# Patient Record
Sex: FEMALE | Race: WHITE | NOT HISPANIC OR LATINO | Employment: OTHER | URBAN - METROPOLITAN AREA
[De-identification: names, ages, dates, MRNs, and addresses within clinical notes are randomized per-mention and may not be internally consistent; named-entity substitution may affect disease eponyms.]

---

## 2017-03-06 ENCOUNTER — TELEPHONE (OUTPATIENT)
Dept: OBSTETRICS AND GYNECOLOGY | Facility: CLINIC | Age: 72
End: 2017-03-06

## 2017-03-06 DIAGNOSIS — N30.90 CYSTITIS: Primary | ICD-10-CM

## 2017-03-06 NOTE — TELEPHONE ENCOUNTER
Pt reports lower abdominal & back pain and a fever yesterday. Placed orders for a urinalysis and instructed pt to go to lab in her area and to provide a urine sample. Pt communicated understanding.    Sent message to Dr. Glass informing her of steps taken.

## 2017-03-06 NOTE — TELEPHONE ENCOUNTER
Discussed symptoms with patient, she denies urgency and frequency. Had lower abdominal cramps, and low grade temp. Had a colonoscopy showing Diverticulosis. Advised to contact PCP, go on soft bland diet, and may need to go to ED if worsens. Discussed Diverticulitis/Diverticulosis.

## 2017-03-06 NOTE — TELEPHONE ENCOUNTER
----- Message from Naheed Miller sent at 3/6/2017  8:09 AM CST -----  Contact: Patient  X _1st Request  _  2nd Request  _  3rd Request    Who:MAIA BELL [[id number]]    Why:Patient states she has a bladder infection and needs to know what can be done for her     What Number to Call Back:Patient can be reached at [phone]    When to Expect a call back: (Before the end of the day)   -- if call after 3:00 call back will be tomorrow.

## 2017-03-06 NOTE — TELEPHONE ENCOUNTER
----- Message from Stephanie Gray LPN sent at 3/6/2017 10:40 AM CST -----  Contact: patient  FYI. Pt called reporting lower back pain, abdominal pain, and a fever yesterday. Orders placed for a urinalysis. Pt will go to the lab near her today and provide a urine sample.

## 2017-08-21 ENCOUNTER — OFFICE VISIT (OUTPATIENT)
Dept: OBSTETRICS AND GYNECOLOGY | Facility: CLINIC | Age: 72
End: 2017-08-21
Attending: OBSTETRICS & GYNECOLOGY
Payer: MEDICARE

## 2017-08-21 VITALS
BODY MASS INDEX: 26.51 KG/M2 | HEIGHT: 61 IN | SYSTOLIC BLOOD PRESSURE: 160 MMHG | DIASTOLIC BLOOD PRESSURE: 84 MMHG | WEIGHT: 140.44 LBS

## 2017-08-21 DIAGNOSIS — Z01.419 ENCOUNTER FOR ROUTINE GYNECOLOGIC EXAMINATION IN MEDICARE PATIENT: Primary | ICD-10-CM

## 2017-08-21 DIAGNOSIS — E89.40 POSTSURGICAL MENOPAUSE: ICD-10-CM

## 2017-08-21 DIAGNOSIS — N95.2 POSTMENOPAUSAL ATROPHIC VAGINITIS: ICD-10-CM

## 2017-08-21 DIAGNOSIS — Z12.31 SCREENING MAMMOGRAM FOR HIGH-RISK PATIENT: ICD-10-CM

## 2017-08-21 PROCEDURE — 99999 PR PBB SHADOW E&M-EST. PATIENT-LVL III: CPT | Mod: PBBFAC,,, | Performed by: OBSTETRICS & GYNECOLOGY

## 2017-08-21 PROCEDURE — G0101 CA SCREEN;PELVIC/BREAST EXAM: HCPCS | Mod: S$GLB,,, | Performed by: OBSTETRICS & GYNECOLOGY

## 2017-08-21 RX ORDER — ESTRADIOL 0.1 MG/G
0.5 CREAM VAGINAL
Qty: 42 G | Refills: 4 | Status: SHIPPED | OUTPATIENT
Start: 2017-08-21 | End: 2018-08-21 | Stop reason: SDUPTHER

## 2017-08-21 NOTE — PROGRESS NOTES
"  Subjective:       Patient ID: Lizeth Blake is a 71 y.o. female.    Chief Complaint:  Gynecologic Exam      History of Present Illness  HPI  Lizeth Blake is a 71 y.o. female  here for her annual GYN exam.     Denies vaginal itching or irritation.  Denies vaginal discharge.  She is sexually active. She has had1 partner for 41 years .   History of abnormal pap: No  Last Pap: was normal  Last MMG: normal--routine follow-up in 12 months  Last Colonoscopy:  colonoscopy 2 years ago without abnormalities.  Denies domestic violence. She does feel safe at home.     Past Medical History:   Diagnosis Date    a Atypical Chest Pain 2015 Referred To Dr. Miguel Vazquez; 14 Ca+ Score Was Low    d Family H/O DM     f Osteopenia     j Chronic Constipation     Dr. Yovany Sauer    j H/O Acute Sigmoid Diverticulitis 2017     Dr. Yovany Sauer; 3/10/17 ABD/Pelvic CT W/WO Contrast = (See Report)    j H/O Hyperplastic Colon Polyps On 13 TC     Dr. Yovany Sauer; Dr. Marysol King (After Her 6/8/15 TC Was Polyp Free): "Repeat TC In 10 YRs"    j Sigmoid And Descending Diverticulosis     Dr. Yovany Sauer; 6/8/15 TC (Dr. Marysol King) = Small IH And EHs, Multiple Small-Mouthed Sigmoid And Descending Colon Diverticulosis; With NO Polyps, And Otherwise Normal    j Small Internal And External Hemorrhoids     Dr. Yovany Sauer; 6/8/15 TC (Dr. Marysol King) = Small IH And EHs, Multiple Small-Mouthed Sigmoid And Descending Colon Diverticulosis; With NO Polyps, And Otherwise Normal    k Family H/O Breast Cancer     k H/O Breast Lumps With Negative Bx     l BLE Toe And Foot And Calf Cramps     16 Zanaflex 1-2 Mg qHS Prn    l Cervical Spinal DDD     Dr. Christopher phillip Lumbar Spinal DDD     Dr. Christopher phillip Lumbar Spinal Stenosis     Dr. Christopher phillip Right Palm Dupuytren's Nodule     Dr. Attila silva Rare Migraines     Wellness Visit 16      Past Surgical " "History:   Procedure Laterality Date    arm surgery( L shoulder) Left     open procedure on shoulder with tendon reconstruction & pin placement    Breast biopsy x 2 Right Breast      BREAST SURGERY      DILATION AND CURETTAGE OF UTERUS      HYSTERECTOMY      TAHBSO    MOUTH SURGERY      PELVIC LAPAROSCOPY  age 36    SHOULDER ARTHROSCOPY Left -    Tonsillectomy       Social History     Social History    Marital status:      Spouse name: N/A    Number of children: N/A    Years of education: N/A     Occupational History    Not on file.     Social History Main Topics    Smoking status: Never Smoker    Smokeless tobacco: Never Used    Alcohol use 0.0 oz/week      Comment: Rarely    Drug use: No    Sexual activity: Yes     Partners: Male     Birth control/ protection: See Surgical Hx      Comment:  since  (41 years)     Other Topics Concern    Not on file     Social History Narrative    No narrative on file     Family History   Problem Relation Age of Onset    Breast cancer Mother 73    Diabetes Mother     Stroke Sister     Colon cancer Cousin     Cervical cancer Paternal Aunt     Coronary artery disease Father     Diabetes Maternal Aunt     Diabetes Maternal Uncle     Diabetes Maternal Grandmother     Ovarian cancer Neg Hx     Hypertension Neg Hx      OB History      Para Term  AB Living    2 1 1   1 1    SAB TAB Ectopic Multiple Live Births            1          BP (!) 160/84   Ht 5' 1" (1.549 m)   Wt 63.7 kg (140 lb 6.9 oz)   BMI 26.53 kg/m²         GYN & OB History    Date of Last Pap: No result found    OB History    Para Term  AB Living   2 1 1   1 1   SAB TAB Ectopic Multiple Live Births           1      # Outcome Date GA Lbr Dash/2nd Weight Sex Delivery Anes PTL Lv   2 AB            1 Term      Vag-Spont   JAS          Review of Systems  Review of Systems   Constitutional: Negative for activity change, appetite change, " fatigue and unexpected weight change.   HENT: Negative.    Eyes: Negative for visual disturbance.   Respiratory: Negative for shortness of breath and wheezing.    Cardiovascular: Negative for chest pain, palpitations and leg swelling.   Gastrointestinal: Negative for abdominal pain, bloating and blood in stool.   Endocrine: Negative for diabetes and hair loss.   Genitourinary: Positive for dyspareunia. Negative for decreased libido.   Musculoskeletal: Negative for back pain and joint swelling.   Skin:  Negative for no acne and hair changes.   Neurological: Negative for headaches.   Hematological: Does not bruise/bleed easily.   Psychiatric/Behavioral: Negative for depression and sleep disturbance. The patient is not nervous/anxious.    Breast: Negative for breast pain and nipple discharge          Objective:    Physical Exam:   Constitutional: She is oriented to person, place, and time. She appears well-developed and well-nourished.    HENT:   Head: Normocephalic and atraumatic.    Eyes: EOM are normal. Pupils are equal, round, and reactive to light.    Neck: Normal range of motion. Neck supple.    Cardiovascular: Normal rate and regular rhythm.     Pulmonary/Chest: Effort normal and breath sounds normal.   BREASTS: Symmetrical, no skin changes or visible lesions.  No palpable masses, nipple discharge bilaterally.          Abdominal: Soft. Bowel sounds are normal.     Genitourinary: Pelvic exam was performed with patient supine.   Genitourinary Comments: PELVIC: Normal external female genitalia without lesions. Normal hair distribution. Adequate perineal body, normal urethral meatus. Vagina atrophic without lesions or discharge. No significant cystocele or rectocele. Bimanual exam shows uterus and cervix to be surgically absent. Adnexa without masses or tenderness.  RECTAL: Deferred             Musculoskeletal: Normal range of motion and moves all extremeties.       Neurological: She is alert and oriented to person,  place, and time.    Skin: Skin is warm and dry.    Psychiatric: She has a normal mood and affect.          Assessment:        1. Encounter for routine gynecologic examination in Medicare patient    2. Postmenopausal atrophic vaginitis    3. Screening mammogram for high-risk patient    4. Postsurgical menopause               Plan:      1. Encounter for routine gynecologic examination in Medicare patient  COUNSELING:  The patient was counseled today on osteoporosis prevention, calcium supplementation, and regular weight bearing exercise. The patient was also counseled today on ACS PAP guidelines, with recommendations for yearly pelvic exams unless their uterus, cervix, and ovaries were removed for benign reasons; in that case, examinations every 3-5 years are recommended. The patient was also counseled regarding monthly breast self-examination, routine STD screening for at-risk populations, prophylactic immunizations for transmitted infections such as HPV, Pertussis, or Influenza as appropriate, and yearly mammograms when indicated by ACS guidelines. She was advised to see her primary care physician for all other health maintenance.   FOLLOW-UP with me for next routine visit.         2. Postmenopausal atrophic vaginitis    - estradiol (ESTRACE) 0.01 % (0.1 mg/gram) vaginal cream; Place 0.5 g vaginally twice a week. Insert 0.5grams intravaginally twice weekly  Dispense: 42 g; Refill: 4    3. Screening mammogram for high-risk patient    - Mammo Digital Screening Bilat with Tomosynthesis CAD; Future    4. Postsurgical menopause    - DXA Bone Density Spine And Hip; Future       Return in about 2 years (around 8/21/2019).

## 2017-08-22 ENCOUNTER — PATIENT MESSAGE (OUTPATIENT)
Dept: OBSTETRICS AND GYNECOLOGY | Facility: CLINIC | Age: 72
End: 2017-08-22

## 2018-08-07 ENCOUNTER — TELEPHONE (OUTPATIENT)
Dept: PAIN MEDICINE | Facility: CLINIC | Age: 73
End: 2018-08-07

## 2018-08-07 NOTE — TELEPHONE ENCOUNTER
----- Message from Elke Devlin LPN sent at 8/6/2018 12:56 PM CDT -----  Pt requesting an appointment for neck pain. Please call pt with soonest available appointment. Thanks!

## 2018-08-21 ENCOUNTER — OFFICE VISIT (OUTPATIENT)
Dept: OBSTETRICS AND GYNECOLOGY | Facility: CLINIC | Age: 73
End: 2018-08-21
Attending: OBSTETRICS & GYNECOLOGY
Payer: MEDICARE

## 2018-08-21 VITALS
DIASTOLIC BLOOD PRESSURE: 80 MMHG | BODY MASS INDEX: 24.38 KG/M2 | HEIGHT: 63 IN | WEIGHT: 137.56 LBS | SYSTOLIC BLOOD PRESSURE: 120 MMHG

## 2018-08-21 DIAGNOSIS — N95.2 POSTMENOPAUSAL ATROPHIC VAGINITIS: ICD-10-CM

## 2018-08-21 DIAGNOSIS — N64.4 MASTODYNIA OF RIGHT BREAST: Primary | ICD-10-CM

## 2018-08-21 DIAGNOSIS — Z01.411 ENCOUNTER FOR GYNECOLOGICAL EXAMINATION WITH ABNORMAL FINDING: ICD-10-CM

## 2018-08-21 PROCEDURE — 99213 OFFICE O/P EST LOW 20 MIN: CPT | Mod: S$GLB,,, | Performed by: OBSTETRICS & GYNECOLOGY

## 2018-08-21 PROCEDURE — 99999 PR PBB SHADOW E&M-EST. PATIENT-LVL III: CPT | Mod: PBBFAC,,, | Performed by: OBSTETRICS & GYNECOLOGY

## 2018-08-21 RX ORDER — ESTRADIOL 0.1 MG/G
0.5 CREAM VAGINAL
Qty: 42 G | Refills: 4 | Status: SHIPPED | OUTPATIENT
Start: 2018-08-23 | End: 2019-09-09 | Stop reason: SDUPTHER

## 2018-08-21 NOTE — PROGRESS NOTES
"  Subjective:       Patient ID: Lizeth Blake is a 72 y.o. female.    Chief Complaint:  atrophic vaginitis (menopause) and mastodynia      History of Present Illness  HPI  Lizeth Blake is a 72 y.o. female  here for her  GYN exam with complaints of RIght sided breast pain for the past 2 months. She also has vaginal dryness which results in dyspareunia when she forgets to use her Estrace cream.     denies vaginal itching or irritation.  Denies vaginal discharge.  She is sexually active. She has had 1 partner for 44 years .   History of abnormal pap: No  Last Pap: was normal  Last MMG: normal--routine follow-up in 12 months  Last Colonoscopy:  colonoscopy a few years ago without abnormalities.  denies domestic violence. She does feel safe at home.     Past Medical History:   Diagnosis Date    a Atypical Chest Pain 2015     Dr. Miguel Vazquez Evaluated Her And Was Negative For CAD; 14 Ca+ Score Was Low    Abnormal Pap smear of cervix     Costochondritis     d Family H/O DM     f Osteopenia     j Chronic Constipation     Dr. Yovany Sauer    j H/O Acute Sigmoid Diverticulitis 2017     Dr. Yovany Sauer; 3/10/17 ABD/Pelvic CT W/WO Contrast = (See Report)    j H/O Hyperplastic Colon Polyps On 13 TC     Dr. Yovany Sauer; Dr. Marysol King (After Her 6/8/15 TC Was Polyp Free): "Repeat TC In 10 YRs"    j Sigmoid And Descending Diverticulosis     Dr. Yovany Sauer; 6/8/15 TC (Dr. Marysol King) = Small IH And EHs, Multiple Small-Mouthed Sigmoid And Descending Colon Diverticulosis; With NO Polyps, And Otherwise Normal    j Small Internal And External Hemorrhoids     Dr. Yovany Sauer; 6/8/15 TC (Dr. Marysol King) = Small IH And EHs, Multiple Small-Mouthed Sigmoid And Descending Colon Diverticulosis; With NO Polyps, And Otherwise Normal    k Family H/O Breast Cancer     k H/O Breast Lumps With Negative Bx     l BLE Toe And Foot And Calf Cramps     16 Zanaflex 1-2 Mg qHS Prn    l " Cervical Spinal DDD     Dr. hCristopher phillip Lumbar Spinal DDD     Dr. Christopher phillip Lumbar Spinal Stenosis     Dr. Christopher phillip Right Palm Dupuytren's Nodule     Dr. Attila silva Rare Migraines     q Borderline Vitamin D Deficiency     1/7/18 RXd OTC D3 2K IU Daily    Wellness Visit 12/18/2017      Past Surgical History:   Procedure Laterality Date    arm surgery( L shoulder) Left 1980    open procedure on shoulder with tendon reconstruction & pin placement    BREAST BIOPSY Right     Breast biopsy x 2 Right Breast      DILATION AND CURETTAGE OF UTERUS      HYSTERECTOMY  2000    TAHBSO    MOUTH SURGERY      PELVIC LAPAROSCOPY  age 36    SHOULDER ARTHROSCOPY Left 2-2014    Tonsillectomy       Social History     Socioeconomic History    Marital status:      Spouse name: Not on file    Number of children: Not on file    Years of education: Not on file    Highest education level: Not on file   Social Needs    Financial resource strain: Not on file    Food insecurity - worry: Not on file    Food insecurity - inability: Not on file    Transportation needs - medical: Not on file    Transportation needs - non-medical: Not on file   Occupational History    Not on file   Tobacco Use    Smoking status: Never Smoker    Smokeless tobacco: Never Used   Substance and Sexual Activity    Alcohol use: Yes     Alcohol/week: 0.0 oz     Comment: Rarely    Drug use: No    Sexual activity: Yes     Partners: Male     Birth control/protection: See Surgical Hx     Comment:  since 1976 (42 years)   Other Topics Concern    Not on file   Social History Narrative    Not on file     Family History   Problem Relation Age of Onset    Breast cancer Mother 73    Diabetes Mother     Stroke Sister     Colon cancer Cousin     Cervical cancer Paternal Aunt     Coronary artery disease Father     Diabetes Maternal Aunt     Diabetes Maternal Uncle     Diabetes Maternal Grandmother      "Ovarian cancer Neg Hx     Hypertension Neg Hx      OB History      Para Term  AB Living    2 1 1   1 1    SAB TAB Ectopic Multiple Live Births            1          /80   Ht 5' 3" (1.6 m)   Wt 62.4 kg (137 lb 9.1 oz)   BMI 24.37 kg/m²         GYN & OB History    Date of Last Pap: No result found    OB History    Para Term  AB Living   2 1 1   1 1   SAB TAB Ectopic Multiple Live Births           1      # Outcome Date GA Lbr Dash/2nd Weight Sex Delivery Anes PTL Lv   2 AB            1 Term      Vag-Spont   JAS          Review of Systems  Review of Systems   Constitutional: Negative for activity change, appetite change, fatigue and unexpected weight change.   HENT: Negative.    Eyes: Negative for visual disturbance.   Respiratory: Negative for shortness of breath and wheezing.    Cardiovascular: Negative for chest pain, palpitations and leg swelling.   Gastrointestinal: Negative for abdominal pain, bloating and blood in stool.   Endocrine: Negative for diabetes, hair loss and hot flashes.   Genitourinary: Positive for dyspareunia. Negative for decreased libido and postmenopausal bleeding.   Musculoskeletal: Negative for back pain and joint swelling.   Skin:  Negative for no acne and hair changes.   Neurological: Negative for headaches.   Hematological: Does not bruise/bleed easily.   Psychiatric/Behavioral: Negative for depression and sleep disturbance. The patient is not nervous/anxious.    Breast: Negative for breast pain and nipple discharge          Objective:      Physical Exam:   Constitutional: She is oriented to person, place, and time. She appears well-developed and well-nourished.    HENT:   Head: Normocephalic and atraumatic.    Eyes: EOM are normal. Pupils are equal, round, and reactive to light.    Neck: Normal range of motion. Neck supple.    Cardiovascular: Normal rate and regular rhythm.     Pulmonary/Chest: Effort normal and breath sounds normal.   BREASTS:  no " mass, + tenderness, no deformity and no retraction. Right breast exhibits no inverted nipple, no mass, no nipple discharge, no skin change, + tenderness UOQ, no bleeding and no swelling. Left breast exhibits no inverted nipple, no mass, no nipple discharge, no skin change, no tenderness, no bleeding and no swelling. Breasts are symmetrical.              Abdominal: Soft. Bowel sounds are normal.     Genitourinary: Pelvic exam was performed with patient supine.   Genitourinary Comments: PELVIC: Normal external female genitalia without lesions. Normal hair distribution. Adequate perineal body, normal urethral meatus. Vagina atrophic without lesions or discharge. No significant cystocele or rectocele. Bimanual exam shows uterus and cervix to be surgically absent. Adnexa without masses or tenderness.  RECTAL: Deferred             Musculoskeletal: Normal range of motion and moves all extremeties.       Neurological: She is alert and oriented to person, place, and time.    Skin: Skin is warm and dry.    Psychiatric: She has a normal mood and affect.              Assessment:        1. Mastodynia of right breast    2. Encounter for gynecological examination with abnormal finding    3. Postmenopausal atrophic vaginitis                Plan:      1. Mastodynia of right breast    - US Breast Right Complete; Future  - Mammo Digital Diagnostic Right with Sagar; Future    2. Postmenopausal atrophic vaginitis    - estradiol (ESTRACE) 0.01 % (0.1 mg/gram) vaginal cream; Place 0.5 g vaginally twice a week. Insert 0.5grams intravaginally twice weekly  Dispense: 42 g; Refill: 4       Follow-up if symptoms worsen or fail to improve.

## 2018-08-22 ENCOUNTER — HOSPITAL ENCOUNTER (OUTPATIENT)
Dept: RADIOLOGY | Facility: HOSPITAL | Age: 73
Discharge: HOME | End: 2018-08-22
Attending: OBSTETRICS & GYNECOLOGY
Payer: MEDICARE

## 2018-08-22 DIAGNOSIS — N64.4 MASTODYNIA OF RIGHT BREAST: ICD-10-CM

## 2018-08-22 PROCEDURE — 77065 DX MAMMO INCL CAD UNI: CPT | Mod: TC,PO

## 2018-08-22 PROCEDURE — 77061 BREAST TOMOSYNTHESIS UNI: CPT | Mod: TC,PO

## 2018-08-22 PROCEDURE — 77061 BREAST TOMOSYNTHESIS UNI: CPT | Mod: 26,,, | Performed by: RADIOLOGY

## 2018-08-22 PROCEDURE — 77065 DX MAMMO INCL CAD UNI: CPT | Mod: 26,,, | Performed by: RADIOLOGY

## 2018-10-17 ENCOUNTER — LAB VISIT (OUTPATIENT)
Dept: LAB | Facility: HOSPITAL | Age: 73
End: 2018-10-17
Attending: OBSTETRICS & GYNECOLOGY
Payer: MEDICARE

## 2018-10-17 ENCOUNTER — PATIENT MESSAGE (OUTPATIENT)
Dept: OBSTETRICS AND GYNECOLOGY | Facility: CLINIC | Age: 73
End: 2018-10-17

## 2018-10-17 DIAGNOSIS — R39.9 UTI SYMPTOMS: ICD-10-CM

## 2018-10-17 DIAGNOSIS — R39.9 UTI SYMPTOMS: Primary | ICD-10-CM

## 2018-10-17 DIAGNOSIS — Z12.31 SCREENING MAMMOGRAM, ENCOUNTER FOR: Primary | ICD-10-CM

## 2018-10-17 PROCEDURE — 87086 URINE CULTURE/COLONY COUNT: CPT

## 2018-10-17 RX ORDER — NITROFURANTOIN 25; 75 MG/1; MG/1
100 CAPSULE ORAL 2 TIMES DAILY
Qty: 14 CAPSULE | Refills: 0 | Status: SHIPPED | OUTPATIENT
Start: 2018-10-17 | End: 2018-10-24

## 2018-10-17 RX ORDER — PHENAZOPYRIDINE HYDROCHLORIDE 200 MG/1
200 TABLET, FILM COATED ORAL 3 TIMES DAILY PRN
Qty: 20 TABLET | Refills: 0 | Status: SHIPPED | OUTPATIENT
Start: 2018-10-17 | End: 2019-01-21

## 2018-10-18 ENCOUNTER — PATIENT MESSAGE (OUTPATIENT)
Dept: OBSTETRICS AND GYNECOLOGY | Facility: CLINIC | Age: 73
End: 2018-10-18

## 2018-10-18 LAB — BACTERIA UR CULT: NO GROWTH

## 2018-11-26 ENCOUNTER — HOSPITAL ENCOUNTER (OUTPATIENT)
Dept: RADIOLOGY | Facility: HOSPITAL | Age: 73
Discharge: HOME | End: 2018-11-26
Attending: OBSTETRICS & GYNECOLOGY
Payer: MEDICARE

## 2018-11-26 DIAGNOSIS — Z12.31 SCREENING MAMMOGRAM, ENCOUNTER FOR: ICD-10-CM

## 2018-11-26 PROCEDURE — 77067 SCR MAMMO BI INCL CAD: CPT | Mod: 26,,, | Performed by: RADIOLOGY

## 2018-11-26 PROCEDURE — 77063 BREAST TOMOSYNTHESIS BI: CPT | Mod: TC,PO

## 2018-11-26 PROCEDURE — 77063 BREAST TOMOSYNTHESIS BI: CPT | Mod: 26,,, | Performed by: RADIOLOGY

## 2019-06-12 ENCOUNTER — OFFICE VISIT (OUTPATIENT)
Dept: NEUROSURGERY | Facility: CLINIC | Age: 74
End: 2019-06-12
Payer: MEDICARE

## 2019-06-12 VITALS
HEART RATE: 66 BPM | BODY MASS INDEX: 25.84 KG/M2 | DIASTOLIC BLOOD PRESSURE: 66 MMHG | TEMPERATURE: 98 F | SYSTOLIC BLOOD PRESSURE: 137 MMHG | HEIGHT: 61 IN | WEIGHT: 136.88 LBS

## 2019-06-12 DIAGNOSIS — M50.20 HERNIATED CERVICAL INTERVERTEBRAL DISC: Primary | ICD-10-CM

## 2019-06-12 DIAGNOSIS — M51.26 HERNIATED LUMBAR INTERVERTEBRAL DISC: ICD-10-CM

## 2019-06-12 DIAGNOSIS — M47.22 CERVICAL SPONDYLOSIS WITH RADICULOPATHY: ICD-10-CM

## 2019-06-12 PROCEDURE — 1101F PT FALLS ASSESS-DOCD LE1/YR: CPT | Mod: CPTII,S$GLB,, | Performed by: NEUROLOGICAL SURGERY

## 2019-06-12 PROCEDURE — 99204 OFFICE O/P NEW MOD 45 MIN: CPT | Mod: S$GLB,,, | Performed by: NEUROLOGICAL SURGERY

## 2019-06-12 PROCEDURE — 99999 PR PBB SHADOW E&M-EST. PATIENT-LVL III: ICD-10-PCS | Mod: PBBFAC,,, | Performed by: NEUROLOGICAL SURGERY

## 2019-06-12 PROCEDURE — 99204 PR OFFICE/OUTPT VISIT, NEW, LEVL IV, 45-59 MIN: ICD-10-PCS | Mod: S$GLB,,, | Performed by: NEUROLOGICAL SURGERY

## 2019-06-12 PROCEDURE — 1101F PR PT FALLS ASSESS DOC 0-1 FALLS W/OUT INJ PAST YR: ICD-10-PCS | Mod: CPTII,S$GLB,, | Performed by: NEUROLOGICAL SURGERY

## 2019-06-12 PROCEDURE — 99999 PR PBB SHADOW E&M-EST. PATIENT-LVL III: CPT | Mod: PBBFAC,,, | Performed by: NEUROLOGICAL SURGERY

## 2019-06-12 NOTE — PROGRESS NOTES
Subjective:   I, Pawel Mcmillan, attest that this documentation has been prepared under the direction and in the presence of Rui Salazar MD.     Patient ID: Lizeth Blake is a 73 y.o. female     Chief Complaint: Consult      HPI  Ms. Lizeth Blake is a pleasant 73 y.o. woman who presents today for consultation. Pt states she has a chronic hx of low back and neck pain. In regards to her neck pain, she states it has been presents for at least 15 years. The pain is exacerbated by movement of her right arm and she has difficulty using a computer mouse at this induces pain. She states the pain is constant at an intensity of 9/10. Her back pain, which is also chronic, waxes and wanes. It is localized across her bilateral lower back and also involves sporadic buttock pain bilaterally. She specifies that the left buttock pain is sharp in nature. Additionally, she states she had right groin pain which spontaneously resolved a few months ago. She tried ESIs and physical therapy in the distant past; has not received any recent treatment. Per her recollection, her back pain was exacerbated and her neck pain unchanged after the ESIs.      Review of Systems   Constitutional: Negative for activity change, fatigue, fever and unexpected weight change.   HENT: Negative for facial swelling.    Eyes: Negative.    Respiratory: Negative.    Cardiovascular: Negative.    Gastrointestinal: Negative for diarrhea, nausea and vomiting.   Genitourinary: Negative.    Musculoskeletal: Positive for back pain and neck pain. Negative for joint swelling and myalgias.   Neurological: Negative for dizziness, weakness, numbness and headaches.   Psychiatric/Behavioral: Negative.       Past Medical History:   Diagnosis Date    a Atypical Chest Pain 08/2015     Dr. Miguel Vazquez Evaluated Her And Was Negative For CAD; 5/22/14 Ca+ Score Was Low    d Family H/O DM     f Osteopenia     j Chronic Constipation     Dr. Yovany lo H/O Acute Sigmoid  "Diverticulitis 05/2017     Dr. Yovany Sauer; 3/10/17 ABD/Pelvic CT W/WO Contrast = (See Report)    j H/O Hyperplastic Colon Polyps On 12/20/13 TC     Dr. Yovany Sauer; Dr. Marysol King (After Her 6/8/15 TC Was Polyp Free): "Repeat TC In 10 YRs"    j Sigmoid And Descending Diverticulosis     Dr. Yovany Sauer; 6/8/15 TC (Dr. Marysol King) = Small IH And EHs, Multiple Small-Mouthed Sigmoid And Descending Colon Diverticulosis; With NO Polyps, And Otherwise Normal    j Small Internal And External Hemorrhoids     Dr. Yovany Sauer; 6/8/15 TC (Dr. Marysol King) = Small IH And EHs, Multiple Small-Mouthed Sigmoid And Descending Colon Diverticulosis; With NO Polyps, And Otherwise Normal    k Family H/O Breast Cancer     k H/O Breast Lumps With Negative Bx     l BLE Toe And Foot And Calf Cramps     7/8/16 Zanaflex 1-2 Mg qHS Prn    l Cervical Spinal DDD     Dr. Christopher Kenny; 5/1/19 C-Spine MRI W/O Contrast = (See Report)    l Lumbar Spinal DDD     Dr. Christopher Kenny; 5/1/19 L-Spine MRI W/O Contrast = (See Report)    l Lumbar Spinal Stenosis     Dr. Christopher Kenny; 5/1/19 L-Spine MRI W/O Contrast = (See Report)    l Right Palm Dupuytren's Nodule     Dr. Attila silva Rare Migraines     q Borderline Vitamin D Deficiency     2/10/19 RXd OTC D3 5K IU Daily; 1/7/18 RXd OTC D3 2K IU Daily    Wellness Visit 1/21/19        Objective:      Vitals:    06/12/19 0945   BP: 137/66   Pulse: 66   Temp: 97.8 °F (36.6 °C)      Physical Exam   Constitutional: She is oriented to person, place, and time. She appears well-developed and well-nourished.   HENT:   Head: Normocephalic and atraumatic.   Neck: Neck supple.   Neurological: She is alert and oriented to person, place, and time. No cranial nerve deficit. She displays a negative Romberg sign. GCS eye subscore is 4. GCS verbal subscore is 5. GCS motor subscore is 6.   Reflex Scores:       Bicep reflexes are 2+ on the right side and 2+ on the left side.     "   IMAGING:  MRI Cervical Spine Without Contrast (05/01/2019) shows degenerative changes at C3-4, C4-5, C5-6 with associated disc herniations with foraminal stenosis but without canal stenosis; findings are worse at the C5-6 level.     MRI Lumbar Spine Without Contrast (05/01/2019) shows a disc herniation at L3-4, L4-5, and L5-S1 with mild-to-moderate stenosis at L3-4 and L4-5. There is a Grade I spondylolisthesis at L4-5.      I have personally reviewed the images with the pt.      I, Dr. Rui Salazar, personally performed the services described in this documentation. All medical record entries made by the scribe, Pawel Mcmillan, were at my direction and in my presence.  I have reviewed the chart and agree that the record reflects my personal performance and is accurate and complete. Rui Salazar MD. 06/12/2019    Assessment:       1. Herniated cervical disc.  2. Cervical radiculopathy.  3. Herniated lumbar disc.    Plan:   Neck pain:  I have personally reviewed the MRI cervical spine with the pt which shows degenerative changes at C3-4, C4-5, C5-6 with associated disc herniations with foraminal stenosis but without canal stenosis; findings are worse at the C5-6 level. Given the images, an Anterior cervical discectomy and fusion (ACDF) is indicated. However, we will try conservative treatment with an MEME at this time. I will refer her to pain medicine.      Back pain:  The MRI of lumbar spine shows a disc herniation at L3-4, L4-5, and L5-S1 with mild-to-moderate stenosis at L3-4 and L4-5. There is a Grade I spondylolisthesis at L4-5 .    I will schedule the patient for follow up in 6 weeks to see how she is doing.

## 2019-06-12 NOTE — PATIENT INSTRUCTIONS
Neck pain:  I have personally reviewed the MRI cervical spine with the pt which shows degenerative changes at C3-4, C4-5, C5-6 with associated disc herniations with foraminal stenosis but without canal stenosis; findings are worse at the C5-6 level. Given the images, an Anterior cervical discectomy and fusion (ACDF) is indicated. However, we will try conservative treatment with an MEME at this time. I will refer her to pain medicine.      Back pain:  The MRI of lumbar spine shows a disc herniation at L3-4, L4-5, and L5-S1 with mild-to-moderate stenosis at L3-4 and L4-5. There is a Grade I spondylolisthesis at L4-5.    I will schedule the patient for follow up in 6 weeks to see how she is doing.

## 2019-09-09 ENCOUNTER — OFFICE VISIT (OUTPATIENT)
Dept: OBSTETRICS AND GYNECOLOGY | Facility: CLINIC | Age: 74
End: 2019-09-09
Attending: OBSTETRICS & GYNECOLOGY
Payer: MEDICARE

## 2019-09-09 VITALS
DIASTOLIC BLOOD PRESSURE: 80 MMHG | SYSTOLIC BLOOD PRESSURE: 111 MMHG | HEIGHT: 61 IN | WEIGHT: 140 LBS | BODY MASS INDEX: 26.43 KG/M2

## 2019-09-09 DIAGNOSIS — N95.2 POSTMENOPAUSAL ATROPHIC VAGINITIS: ICD-10-CM

## 2019-09-09 DIAGNOSIS — Z01.419 ENCOUNTER FOR ROUTINE GYNECOLOGIC EXAMINATION IN MEDICARE PATIENT: ICD-10-CM

## 2019-09-09 DIAGNOSIS — Z12.31 SCREENING MAMMOGRAM, ENCOUNTER FOR: ICD-10-CM

## 2019-09-09 DIAGNOSIS — Z01.419 ENCOUNTER FOR GYNECOLOGICAL EXAMINATION WITHOUT ABNORMAL FINDING: Primary | ICD-10-CM

## 2019-09-09 PROCEDURE — G0101 CA SCREEN;PELVIC/BREAST EXAM: HCPCS | Mod: S$GLB,,, | Performed by: OBSTETRICS & GYNECOLOGY

## 2019-09-09 PROCEDURE — G0101 PR CA SCREEN;PELVIC/BREAST EXAM: ICD-10-PCS | Mod: S$GLB,,, | Performed by: OBSTETRICS & GYNECOLOGY

## 2019-09-09 RX ORDER — ESTRADIOL 0.1 MG/G
0.5 CREAM VAGINAL
Qty: 42 G | Refills: 4 | Status: SHIPPED | OUTPATIENT
Start: 2019-09-09 | End: 2019-09-09

## 2019-09-09 RX ORDER — ESTRADIOL 0.1 MG/G
0.5 CREAM VAGINAL
Qty: 42 G | Refills: 4 | Status: SHIPPED | OUTPATIENT
Start: 2019-09-09 | End: 2020-08-10 | Stop reason: SDUPTHER

## 2019-09-09 NOTE — PROGRESS NOTES
"  Subjective:       Patient ID: Lizeth Blake is a 73 y.o. female.    Chief Complaint:  Well Woman      History of Present Illness  HPI  Lizeth Blake is a 73 y.o. female  here for her annual GYN exam.     denies vaginal itching or irritation.  Denies vaginal discharge.  She is sexually active. She has had 1 partner for the past 43 years .   History of abnormal pap: Yes - many years ago  Last Pap: was normal  Last MMG: normal--routine follow-up in 12 months  Last Colonoscopy:  colonoscopy 5 years ago without abnormalities.  denies domestic violence. She does feel safe at home.     Past Medical History:   Diagnosis Date    a Atypical Chest Pain 2015     Dr. Miguel Vazquez Evaluated Her And Was Negative For CAD; 14 Ca+ Score Was Low    a Recurrent Vasovagal Syncopal Episodes ###    This Has Been A Problem For Her For Her Whole Life; 19 Ordered A Cortisol Level; [facility] ED 19 Visit For This    b Chronic Hypotension ###    19 Cortisol And ACTH = Normal    d Family H/O DM     f Osteopenia     j Chronic Constipation     Dr. Yovany Sauer    j H/O Acute Sigmoid Diverticulitis 2017     Dr. Yovany Sauer; 3/10/17 ABD/Pelvic CT W/WO Contrast = (See Report)    j H/O Hyperplastic Colon Polyps On 13 TC     Dr. Yovany Sauer; Dr. Marysol King (After Her 6/8/15 TC Was Polyp Free): "Repeat TC In 10 YRs"    j Sigmoid And Descending Diverticulosis     Dr. Yovany Sauer; 6/8/15 TC (Dr. Marysol King) = Small IH And EHs, Multiple Small-Mouthed Sigmoid And Descending Colon Diverticulosis; With NO Polyps, And Otherwise Normal    j Small Internal And External Hemorrhoids     Dr. Yovany Sauer; 6/8/15 TC (Dr. Marysol King) = Small IH And EHs, Multiple Small-Mouthed Sigmoid And Descending Colon Diverticulosis; With NO Polyps, And Otherwise Normal    k Family H/O Breast Cancer     k H/O Breast Lumps With Negative Bx     l BLE Toe And Foot And Calf Cramps     16 Zanaflex 1-2 Mg qHS Prn "    l Cervical Spinal DDD     Dr. Christopher Kenny; 5/1/19 C-Spine MRI W/O Contrast = (See Report)    l Lumbar Spinal DDD     Dr. Christopher Kenny; 5/1/19 L-Spine MRI W/O Contrast = (See Report)    l Lumbar Spinal Stenosis     Dr. Christopher Kenny; 5/1/19 L-Spine MRI W/O Contrast = (See Report)    l Right Palm Dupuytren's Nodule     Dr. Attila Ortega    Low Cortisol Level 7/19/19 ###    7/19/19 Ordered A Repeat AM Cortisol And An ACTH Level    m Rare Migraines     q Borderline Vitamin D Deficiency     2/10/19 RXd OTC D3 5K IU Daily; 1/7/18 RXd OTC D3 2K IU Daily    Wellness Visit 1/21/19      Past Surgical History:   Procedure Laterality Date    arm surgery( L shoulder) Left 1980    open procedure on shoulder with tendon reconstruction & pin placement    BREAST BIOPSY Right     Breast biopsy x 2 Right Breast      DILATION AND CURETTAGE OF UTERUS      HYSTERECTOMY  2000    TAHBSO    MOUTH SURGERY      PELVIC LAPAROSCOPY  age 36    SHOULDER ARTHROSCOPY Left 2-2014    Tonsillectomy       Social History     Socioeconomic History    Marital status:      Spouse name: Not on file    Number of children: Not on file    Years of education: Not on file    Highest education level: Not on file   Occupational History    Not on file   Social Needs    Financial resource strain: Not on file    Food insecurity:     Worry: Not on file     Inability: Not on file    Transportation needs:     Medical: Not on file     Non-medical: Not on file   Tobacco Use    Smoking status: Never Smoker    Smokeless tobacco: Never Used   Substance and Sexual Activity    Alcohol use: Yes     Alcohol/week: 0.0 oz     Comment: Rarely    Drug use: No    Sexual activity: Yes     Partners: Male     Birth control/protection: See Surgical Hx     Comment:  since 1976 (44 years)   Lifestyle    Physical activity:     Days per week: Not on file     Minutes per session: Not on file    Stress: To some extent   Relationships    Social  "connections:     Talks on phone: Not on file     Gets together: Not on file     Attends Religion service: Not on file     Active member of club or organization: Not on file     Attends meetings of clubs or organizations: Not on file     Relationship status: Not on file   Other Topics Concern    Not on file   Social History Narrative    Not on file     Family History   Problem Relation Age of Onset    Breast cancer Mother 73    Diabetes Mother     Stroke Sister     Colon cancer Cousin     Cervical cancer Paternal Aunt     Coronary artery disease Father     Diabetes Maternal Aunt     Diabetes Maternal Uncle     Diabetes Maternal Grandmother     Ovarian cancer Neg Hx     Hypertension Neg Hx      OB History        2    Para   1    Term   1       0    AB   1    Living   1       SAB   0    TAB   0    Ectopic   0    Multiple   0    Live Births   1                 /80   Ht 5' 1" (1.549 m)   Wt 63.5 kg (139 lb 15.9 oz)   BMI 26.45 kg/m²         GYN & OB History    Date of Last Pap: No result found    OB History    Para Term  AB Living   2 1 1 0 1 1   SAB TAB Ectopic Multiple Live Births   0 0 0 0 1      # Outcome Date GA Lbr Dash/2nd Weight Sex Delivery Anes PTL Lv   2 AB            1 Term      Vag-Spont   JAS       Review of Systems  Review of Systems   Constitutional: Negative for activity change, appetite change, fatigue and unexpected weight change.   HENT: Negative.    Eyes: Negative for visual disturbance.   Respiratory: Negative for shortness of breath and wheezing.    Cardiovascular: Negative for chest pain, palpitations and leg swelling.   Gastrointestinal: Negative for abdominal pain, bloating and blood in stool.   Endocrine: Negative for diabetes, hair loss and hot flashes.   Genitourinary: Positive for dyspareunia and vaginal dryness. Negative for decreased libido and hot flashes.   Musculoskeletal: Negative for back pain and joint swelling.   Integumentary:  " Negative for acne, hair changes and nipple discharge.   Neurological: Negative for headaches.   Hematological: Does not bruise/bleed easily.   Psychiatric/Behavioral: Negative for depression and sleep disturbance. The patient is not nervous/anxious.    Breast: Negative for mastodynia and nipple discharge          Objective:      Physical Exam:   Constitutional: She is oriented to person, place, and time. She appears well-developed and well-nourished.    HENT:   Head: Normocephalic and atraumatic.    Eyes: Pupils are equal, round, and reactive to light. EOM are normal.    Neck: Normal range of motion. Neck supple.    Cardiovascular: Normal rate and regular rhythm.     Pulmonary/Chest: Effort normal and breath sounds normal.   BREASTS:  no mass, no tenderness, no deformity and no retraction. Right breast exhibits no inverted nipple, no mass, no nipple discharge, no skin change, no tenderness, no bleeding and no swelling. Left breast exhibits no inverted nipple, no mass, no nipple discharge, no skin change, no tenderness, no bleeding and no swelling. Breasts are symmetrical.              Abdominal: Soft. Bowel sounds are normal.     Genitourinary: Pelvic exam was performed with patient supine.   Genitourinary Comments: PELVIC: Normal external female genitalia without lesions. Normal hair distribution. Adequate perineal body, normal urethral meatus. Vagina atrophic without lesions or discharge. No significant cystocele or rectocele. Bimanual exam shows uterus and cervix to be surgically absent. Adnexa without masses or tenderness.  RECTAL: Deferred             Musculoskeletal: Normal range of motion and moves all extremeties.       Neurological: She is alert and oriented to person, place, and time.    Skin: Skin is warm and dry.    Psychiatric: She has a normal mood and affect.              Assessment:        1. Screening mammogram, encounter for    2. Postmenopausal atrophic vaginitis    3. Encounter for gynecological  examination without abnormal finding                Plan:      1. Encounter for gynecological examination without abnormal finding  COUNSELING:  The patient was counseled today on regular weight bearing exercise. Patient was counseled today on the new ACS guidelines for cervical cytology screening as well as the current recommendations for breast cancer screening. Counseling session lasted approximately 10 minutes, and all her questions were answered. She was advised to see her primary care physician for all other health maintenance.   FOLLOW-UP with me for next routine visit.         2. Postmenopausal atrophic vaginitis      - estradiol (ESTRACE) 0.01 % (0.1 mg/gram) vaginal cream; Place 0.5 g vaginally 3 (three) times a week. Insert 0.5grams intravaginally twice weekly  Dispense: 42 g; Refill: 4    3. Screening mammogram, encounter for      - Mammo Digital Screening Bilat w/ Sagar; Future    4. Encounter for routine gynecologic examination in Medicare patient           Follow up in about 2 years (around 9/9/2021).

## 2019-12-04 ENCOUNTER — HOSPITAL ENCOUNTER (OUTPATIENT)
Dept: RADIOLOGY | Facility: HOSPITAL | Age: 74
Discharge: HOME | End: 2019-12-04
Attending: OBSTETRICS & GYNECOLOGY
Payer: MEDICARE

## 2019-12-04 DIAGNOSIS — Z12.31 SCREENING MAMMOGRAM, ENCOUNTER FOR: ICD-10-CM

## 2019-12-04 PROCEDURE — 77067 MAMMO DIGITAL SCREENING BILAT WITH TOMOSYNTHESIS_CAD: ICD-10-PCS | Mod: 26,,, | Performed by: RADIOLOGY

## 2019-12-04 PROCEDURE — 77067 SCR MAMMO BI INCL CAD: CPT | Mod: 26,,, | Performed by: RADIOLOGY

## 2019-12-04 PROCEDURE — 77063 MAMMO DIGITAL SCREENING BILAT WITH TOMOSYNTHESIS_CAD: ICD-10-PCS | Mod: 26,,, | Performed by: RADIOLOGY

## 2019-12-04 PROCEDURE — 77067 SCR MAMMO BI INCL CAD: CPT | Mod: TC,PO

## 2019-12-04 PROCEDURE — 77063 BREAST TOMOSYNTHESIS BI: CPT | Mod: 26,,, | Performed by: RADIOLOGY

## 2020-04-23 ENCOUNTER — PATIENT MESSAGE (OUTPATIENT)
Dept: OBSTETRICS AND GYNECOLOGY | Facility: CLINIC | Age: 75
End: 2020-04-23

## 2020-08-10 ENCOUNTER — OFFICE VISIT (OUTPATIENT)
Dept: OBSTETRICS AND GYNECOLOGY | Facility: CLINIC | Age: 75
End: 2020-08-10
Attending: OBSTETRICS & GYNECOLOGY
Payer: MEDICARE

## 2020-08-10 VITALS
HEIGHT: 61 IN | WEIGHT: 135.81 LBS | SYSTOLIC BLOOD PRESSURE: 136 MMHG | BODY MASS INDEX: 25.64 KG/M2 | DIASTOLIC BLOOD PRESSURE: 68 MMHG

## 2020-08-10 DIAGNOSIS — Z01.419 ENCOUNTER FOR GYNECOLOGICAL EXAMINATION WITHOUT ABNORMAL FINDING: Primary | ICD-10-CM

## 2020-08-10 DIAGNOSIS — Z12.31 SCREENING MAMMOGRAM, ENCOUNTER FOR: ICD-10-CM

## 2020-08-10 DIAGNOSIS — N95.2 POSTMENOPAUSAL ATROPHIC VAGINITIS: ICD-10-CM

## 2020-08-10 PROCEDURE — G0101 CA SCREEN;PELVIC/BREAST EXAM: HCPCS | Mod: S$GLB,,, | Performed by: OBSTETRICS & GYNECOLOGY

## 2020-08-10 PROCEDURE — G0101 PR CA SCREEN;PELVIC/BREAST EXAM: ICD-10-PCS | Mod: S$GLB,,, | Performed by: OBSTETRICS & GYNECOLOGY

## 2020-08-10 RX ORDER — ESTRADIOL 0.1 MG/G
0.5 CREAM VAGINAL
Qty: 42 G | Refills: 4 | Status: SHIPPED | OUTPATIENT
Start: 2020-08-10 | End: 2021-08-03 | Stop reason: SDUPTHER

## 2020-08-10 NOTE — PROGRESS NOTES
"  Subjective:       Patient ID: Lizeth Blake is a 74 y.o. female.    Chief Complaint:  Annual Exam      History of Present Illness  HPI  Lizeth Blake is a 74 y.o. female  here for her annual GYN exam.     denies vaginal itching or irritation.  Denies vaginal discharge.  She is not currently sexually active. She has had 1 partner for 44 years .   History of abnormal pap: No  Last Pap: was normal  Last MMG: normal--routine follow-up in 12 months  Last Colonoscopy:  colonoscopy a few years ago without abnormalities.  denies domestic violence. She does feel safe at home.     Past Medical History:   Diagnosis Date    a Atypical Chest Pain 2015     Dr. Miguel Vazquez Evaluated Her And Was Negative For CAD; 14 Ca+ Score Was Low    a Recurrent Vasovagal Syncopal Episodes ###    This Has Been A Problem For Her For Her Whole Life; 19 Ordered A Cortisol Level; [facility] ED 19 Visit For This    b Chronic Hypotension #######    19 Cortisol And ACTH = Normal    d Family H/O DM     f Osteoporosis     20 RXd Fosamax 70 Mg Weekly, And Continued Her OTC D3 5K IU Daily, And OTC CaCO3 1,200 Mg Daily    j Chronic Constipation     Dr. Yovany Sauer    j H/O Acute Sigmoid Diverticulitis 2017     Dr. Yovany Sauer; 3/10/17 ABD/Pelvic CT W/WO Contrast = (See Report)    j H/O Hyperplastic Colon Polyps On 13 TC     Dr. Yovany Sauer; Dr. Marysol King (After Her 6/8/15 TC Was Polyp Free): "Repeat TC In 10 YRs"    j Sigmoid And Descending Diverticulosis     Dr. Yovany Sauer; 6/8/15 TC (Dr. Marysol King) = Small IH And EHs, Multiple Small-Mouthed Sigmoid And Descending Colon Diverticulosis; With NO Polyps, And Otherwise Normal    j Small Internal And External Hemorrhoids     Dr. Yovany Sauer; 6/8/15 TC (Dr. Marysol King) = Small IH And EHs, Multiple Small-Mouthed Sigmoid And Descending Colon Diverticulosis; With NO Polyps, And Otherwise Normal    k Family H/O Breast Cancer     k H/O " Breast Lumps With Negative Bx     l BLE Toe And Foot And Calf Cramps     7/8/16 Zanaflex 1-2 Mg qHS Prn    l Cervical Spinal DDD     Dr. Christopher Kenny; 5/1/19 C-Spine MRI W/O Contrast = (See Report)    l Lumbar Spinal DDD     Dr. Christopher Kenny; 5/1/19 L-Spine MRI W/O Contrast = (See Report)    l Lumbar Spinal Stenosis     Dr. Christopher Kenny; 5/1/19 L-Spine MRI W/O Contrast = (See Report)    l Right Palm Dupuytren's Nodule     Dr. Attila silva Rare Migraines     q Borderline Vitamin D Deficiency     2/10/19 RXd OTC D3 5K IU Daily; 1/7/18 RXd OTC D3 2K IU Daily    Wellness Visit 1/21/19      Past Surgical History:   Procedure Laterality Date    arm surgery( L shoulder) Left 1980    open procedure on shoulder with tendon reconstruction & pin placement    BREAST BIOPSY Right     Breast biopsy x 2 Right Breast      DILATION AND CURETTAGE OF UTERUS      HYSTERECTOMY  2000    TAHBSO    MOUTH SURGERY      PELVIC LAPAROSCOPY  age 36    SHOULDER ARTHROSCOPY Left 2-2014    Tonsillectomy       Social History     Socioeconomic History    Marital status:      Spouse name: Not on file    Number of children: Not on file    Years of education: Not on file    Highest education level: Not on file   Occupational History    Not on file   Social Needs    Financial resource strain: Not on file    Food insecurity     Worry: Not on file     Inability: Not on file    Transportation needs     Medical: Not on file     Non-medical: Not on file   Tobacco Use    Smoking status: Never Smoker    Smokeless tobacco: Never Used   Substance and Sexual Activity    Alcohol use: Yes     Alcohol/week: 0.0 standard drinks     Comment: Rarely    Drug use: No    Sexual activity: Yes     Partners: Male     Birth control/protection: See Surgical Hx     Comment:  since 1976 (44 years)   Lifestyle    Physical activity     Days per week: Not on file     Minutes per session: Not on file    Stress: To some extent  "  Relationships    Social connections     Talks on phone: Not on file     Gets together: Not on file     Attends Denominational service: Not on file     Active member of club or organization: Not on file     Attends meetings of clubs or organizations: Not on file     Relationship status: Not on file   Other Topics Concern    Not on file   Social History Narrative    Not on file     Family History   Problem Relation Age of Onset    Breast cancer Mother 73    Diabetes Mother     Stroke Sister     Colon cancer Cousin     Cervical cancer Paternal Aunt     Coronary artery disease Father     Diabetes Maternal Aunt     Diabetes Maternal Uncle     Diabetes Maternal Grandmother     Ovarian cancer Neg Hx     Hypertension Neg Hx      OB History        2    Para   1    Term   1       0    AB   1    Living   1       SAB   0    TAB   0    Ectopic   0    Multiple   0    Live Births   1                 /68   Ht 5' 1" (1.549 m)   Wt 61.6 kg (135 lb 12.9 oz)   BMI 25.66 kg/m²         GYN & OB History    Date of Last Pap: No result found    OB History    Para Term  AB Living   2 1 1 0 1 1   SAB TAB Ectopic Multiple Live Births   0 0 0 0 1      # Outcome Date GA Lbr Dash/2nd Weight Sex Delivery Anes PTL Lv   2 AB            1 Term      Vag-Spont   JAS       Review of Systems  Review of Systems   Constitutional: Negative for activity change, appetite change, fatigue and unexpected weight change.   HENT: Negative.    Eyes: Negative for visual disturbance.   Respiratory: Negative for shortness of breath and wheezing.    Cardiovascular: Negative for chest pain, palpitations and leg swelling.   Gastrointestinal: Negative for abdominal pain, bloating and blood in stool.   Endocrine: Negative for diabetes, hair loss and hot flashes.   Genitourinary: Positive for vaginal dryness. Negative for decreased libido, dyspareunia and hot flashes.   Musculoskeletal: Negative for back pain and joint swelling. "   Integumentary:  Negative for acne, hair changes and nipple discharge.   Neurological: Negative for headaches.   Hematological: Does not bruise/bleed easily.   Psychiatric/Behavioral: Negative for depression and sleep disturbance. The patient is not nervous/anxious.    Breast: Negative for mastodynia and nipple discharge          Objective:      Physical Exam:   Constitutional: She is oriented to person, place, and time. She appears well-developed and well-nourished.    HENT:   Head: Normocephalic and atraumatic.    Eyes: Pupils are equal, round, and reactive to light. EOM are normal.    Neck: Normal range of motion. Neck supple.    Cardiovascular: Normal rate and regular rhythm.     Pulmonary/Chest: Effort normal and breath sounds normal.   BREASTS:  no mass, no tenderness, no deformity and no retraction. Right breast exhibits no inverted nipple, no mass, no nipple discharge, no skin change, no tenderness, no bleeding and no swelling. Left breast exhibits no inverted nipple, no mass, no nipple discharge, no skin change, no tenderness, no bleeding and no swelling. Breasts are symmetrical.              Abdominal: Soft. Bowel sounds are normal.     Genitourinary:    Pelvic exam was performed with patient supine.      Genitourinary Comments: PELVIC: Normal external female genitalia without lesions. Normal hair distribution. Adequate perineal body, normal urethral meatus. Vagina atrophic without lesions or discharge. No significant cystocele or rectocele. Bimanual exam shows uterus and cervix to be surgically absent. Adnexa without masses or tenderness.  RECTAL: Deferred               Musculoskeletal: Normal range of motion and moves all extremeties.       Neurological: She is alert and oriented to person, place, and time.    Skin: Skin is warm and dry.    Psychiatric: She has a normal mood and affect.              Assessment:        1. Encounter for gynecological examination without abnormal finding    2. Screening  mammogram, encounter for    3. Postmenopausal atrophic vaginitis                Plan:      1. Encounter for gynecological examination without abnormal finding    COUNSELING:  The patient was counseled today on regular weight bearing exercise. Patient was counseled today on the new ACS guidelines for cervical cytology screening as well as the current recommendations for breast cancer screening. Counseling session lasted approximately 10 minutes, and all her questions were answered. She was advised to see her primary care physician for all other health maintenance.   FOLLOW-UP with me for next routine visit.         2. Screening mammogram, encounter for      - Mammo Digital Screening Bilat w/ Sagar; Future    3. Postmenopausal atrophic vaginitis      - estradioL (ESTRACE) 0.01 % (0.1 mg/gram) vaginal cream; Place 0.5 g vaginally 3 (three) times a week. Insert 0.5grams intravaginally twice weekly  Dispense: 42 g; Refill: 4       Follow up in about 1 year (around 8/10/2021).

## 2020-09-28 ENCOUNTER — LAB VISIT (OUTPATIENT)
Dept: LAB | Facility: HOSPITAL | Age: 75
End: 2020-09-28
Attending: OBSTETRICS & GYNECOLOGY
Payer: MEDICARE

## 2020-09-28 ENCOUNTER — PATIENT MESSAGE (OUTPATIENT)
Dept: OBSTETRICS AND GYNECOLOGY | Facility: CLINIC | Age: 75
End: 2020-09-28

## 2020-09-28 DIAGNOSIS — R39.9 UTI SYMPTOMS: ICD-10-CM

## 2020-09-28 DIAGNOSIS — R39.9 UTI SYMPTOMS: Primary | ICD-10-CM

## 2020-09-28 LAB
BILIRUB UR QL STRIP: NEGATIVE
CLARITY UR: CLEAR
COLOR UR: YELLOW
GLUCOSE UR QL STRIP: NEGATIVE
HGB UR QL STRIP: NEGATIVE
KETONES UR QL STRIP: NEGATIVE
LEUKOCYTE ESTERASE UR QL STRIP: NEGATIVE
NITRITE UR QL STRIP: NEGATIVE
PH UR STRIP: 6 [PH] (ref 5–8)
PROT UR QL STRIP: NEGATIVE
SP GR UR STRIP: >=1.03 (ref 1–1.03)
URN SPEC COLLECT METH UR: ABNORMAL

## 2020-09-28 PROCEDURE — 87086 URINE CULTURE/COLONY COUNT: CPT

## 2020-09-28 PROCEDURE — 81003 URINALYSIS AUTO W/O SCOPE: CPT | Mod: PO

## 2020-09-28 RX ORDER — NITROFURANTOIN 25; 75 MG/1; MG/1
100 CAPSULE ORAL 2 TIMES DAILY
Qty: 14 CAPSULE | Refills: 0 | Status: SHIPPED | OUTPATIENT
Start: 2020-09-28 | End: 2020-10-05

## 2020-09-28 RX ORDER — PHENAZOPYRIDINE HYDROCHLORIDE 200 MG/1
200 TABLET, FILM COATED ORAL 3 TIMES DAILY PRN
Qty: 6 TABLET | Refills: 0 | Status: SHIPPED | OUTPATIENT
Start: 2020-09-28 | End: 2020-09-30

## 2020-09-30 LAB
BACTERIA UR CULT: NORMAL
BACTERIA UR CULT: NORMAL

## 2020-12-10 ENCOUNTER — OFFICE VISIT (OUTPATIENT)
Dept: OTOLARYNGOLOGY | Facility: CLINIC | Age: 75
End: 2020-12-10
Payer: MEDICARE

## 2020-12-10 ENCOUNTER — CLINICAL SUPPORT (OUTPATIENT)
Dept: AUDIOLOGY | Facility: CLINIC | Age: 75
End: 2020-12-10
Payer: MEDICARE

## 2020-12-10 VITALS — HEIGHT: 61 IN | WEIGHT: 135.81 LBS | BODY MASS INDEX: 25.64 KG/M2

## 2020-12-10 DIAGNOSIS — H90.3 BILATERAL SENSORINEURAL HEARING LOSS: Primary | ICD-10-CM

## 2020-12-10 DIAGNOSIS — H90.3 BILATERAL SENSORINEURAL HEARING LOSS: ICD-10-CM

## 2020-12-10 DIAGNOSIS — H93.12 TINNITUS, LEFT: ICD-10-CM

## 2020-12-10 DIAGNOSIS — H93.A2 PULSATILE TINNITUS, LEFT EAR: Primary | ICD-10-CM

## 2020-12-10 PROCEDURE — 92567 TYMPANOMETRY: CPT | Mod: S$GLB,,, | Performed by: AUDIOLOGIST-HEARING AID FITTER

## 2020-12-10 PROCEDURE — 3288F PR FALLS RISK ASSESSMENT DOCUMENTED: ICD-10-PCS | Mod: CPTII,S$GLB,, | Performed by: NURSE PRACTITIONER

## 2020-12-10 PROCEDURE — 1101F PT FALLS ASSESS-DOCD LE1/YR: CPT | Mod: CPTII,S$GLB,, | Performed by: NURSE PRACTITIONER

## 2020-12-10 PROCEDURE — 99203 OFFICE O/P NEW LOW 30 MIN: CPT | Mod: S$GLB,,, | Performed by: NURSE PRACTITIONER

## 2020-12-10 PROCEDURE — 1126F AMNT PAIN NOTED NONE PRSNT: CPT | Mod: S$GLB,,, | Performed by: NURSE PRACTITIONER

## 2020-12-10 PROCEDURE — 1126F PR PAIN SEVERITY QUANTIFIED, NO PAIN PRESENT: ICD-10-PCS | Mod: S$GLB,,, | Performed by: NURSE PRACTITIONER

## 2020-12-10 PROCEDURE — 3008F BODY MASS INDEX DOCD: CPT | Mod: CPTII,S$GLB,, | Performed by: NURSE PRACTITIONER

## 2020-12-10 PROCEDURE — 1101F PR PT FALLS ASSESS DOC 0-1 FALLS W/OUT INJ PAST YR: ICD-10-PCS | Mod: CPTII,S$GLB,, | Performed by: NURSE PRACTITIONER

## 2020-12-10 PROCEDURE — 3008F PR BODY MASS INDEX (BMI) DOCUMENTED: ICD-10-PCS | Mod: CPTII,S$GLB,, | Performed by: NURSE PRACTITIONER

## 2020-12-10 PROCEDURE — 99999 PR PBB SHADOW E&M-EST. PATIENT-LVL I: CPT | Mod: PBBFAC,,,

## 2020-12-10 PROCEDURE — 99999 PR PBB SHADOW E&M-EST. PATIENT-LVL III: CPT | Mod: PBBFAC,,, | Performed by: NURSE PRACTITIONER

## 2020-12-10 PROCEDURE — 99203 PR OFFICE/OUTPT VISIT, NEW, LEVL III, 30-44 MIN: ICD-10-PCS | Mod: S$GLB,,, | Performed by: NURSE PRACTITIONER

## 2020-12-10 PROCEDURE — 3288F FALL RISK ASSESSMENT DOCD: CPT | Mod: CPTII,S$GLB,, | Performed by: NURSE PRACTITIONER

## 2020-12-10 PROCEDURE — 1159F MED LIST DOCD IN RCRD: CPT | Mod: S$GLB,,, | Performed by: NURSE PRACTITIONER

## 2020-12-10 PROCEDURE — 99999 PR PBB SHADOW E&M-EST. PATIENT-LVL I: ICD-10-PCS | Mod: PBBFAC,,,

## 2020-12-10 PROCEDURE — 99999 PR PBB SHADOW E&M-EST. PATIENT-LVL III: ICD-10-PCS | Mod: PBBFAC,,, | Performed by: NURSE PRACTITIONER

## 2020-12-10 PROCEDURE — 92557 COMPREHENSIVE HEARING TEST: CPT | Mod: S$GLB,,, | Performed by: AUDIOLOGIST-HEARING AID FITTER

## 2020-12-10 PROCEDURE — 92557 PR COMPREHENSIVE HEARING TEST: ICD-10-PCS | Mod: S$GLB,,, | Performed by: AUDIOLOGIST-HEARING AID FITTER

## 2020-12-10 PROCEDURE — 1159F PR MEDICATION LIST DOCUMENTED IN MEDICAL RECORD: ICD-10-PCS | Mod: S$GLB,,, | Performed by: NURSE PRACTITIONER

## 2020-12-10 PROCEDURE — 92567 PR TYMPA2METRY: ICD-10-PCS | Mod: S$GLB,,, | Performed by: AUDIOLOGIST-HEARING AID FITTER

## 2020-12-10 RX ORDER — A/SINGAPORE/GP1908/2015 IVR-180 (AN A/MICHIGAN/45/2015 (H1N1)PDM09-LIKE VIRUS, A/HONG KONG/4801/2014, NYMC X-263B (H3N2) (AN A/HONG KONG/4801/2014-LIKE VIRUS), AND B/BRISBANE/60/2008, WILD TYPE (A B/BRISBANE/60/2008-LIKE VIRUS) 15; 15; 15 UG/.5ML; UG/.5ML; UG/.5ML
INJECTION, SUSPENSION INTRAMUSCULAR
COMMUNITY
Start: 2020-10-14 | End: 2021-01-28

## 2020-12-10 NOTE — PATIENT INSTRUCTIONS
"The "gold standard" for determining the presence or absence of middle ear fluid is tympanometry.     You have normal, Type "A" tympanograms, which means no fluid behind your ear drums.  Since there is no middle ear fluid, there is no infection.     Other possible causes for continued ear pain can include but are not limited to:   · dental pathology or TMJ (jaw joint arthritis) -- see your dentist  · cervical spine arthritis (discuss possible imaging/MRI with PCP)  · heck/neck neoplasms (CT neck w/contrast)  · myofascial pain syndrome/headaches or neuralgias (see your neurologist)  · Shingles (would break out in a painful, red, blistering rash on one side)  · GERD (anti-acid reflux medications twice daily and see your GI)    Discussed pulsatile tinnitus. Check blood pressure. Reduce salt and caffeine.   Monitor whether firm compression of jugular vein on the affected side resolves the sound.   If so, venous hum likely etiology; if not, arterial etiology more likely.   If arterial, consideration of CTA of head & neck to check for vascular pathology.     "

## 2020-12-10 NOTE — PROGRESS NOTES
Subjective:       Patient ID: Lizeth Blake is a 74 y.o. female.    Chief Complaint: Other (hears heartbeat in ear)    HPI   Patient is new to ENT, self-referred for heart beat in left ear. It sounded like paper crinkling in her left ear for a few weeks, then about a week ago she started hearing her heart beat in her left ear only. No carotid artery u/s done. BP is fine. No compressibility testing done. Not constant, comes and goes. When present it is faint, only noticeable in very quiet environment.     Review of Systems   Constitutional: Negative.    HENT: Negative.    Eyes: Negative.    Respiratory: Negative.    Cardiovascular: Negative.    Gastrointestinal: Negative.    Musculoskeletal: Negative.    Integumentary:  Negative.   Neurological: Negative.    Hematological: Negative.    Psychiatric/Behavioral: Negative.          Objective:      Physical Exam  Vitals signs and nursing note reviewed.   Constitutional:       General: She is not in acute distress.     Appearance: She is well-developed. She is not ill-appearing or diaphoretic.   HENT:      Head: Normocephalic and atraumatic.      Right Ear: Hearing, tympanic membrane, ear canal and external ear normal. No middle ear effusion. Tympanic membrane is not erythematous.      Left Ear: Hearing, tympanic membrane, ear canal and external ear normal.  No middle ear effusion. Tympanic membrane is not erythematous.      Nose: Nose normal.      Mouth/Throat:      Pharynx: Uvula midline.   Eyes:      General: Lids are normal. No scleral icterus.        Right eye: No discharge.         Left eye: No discharge.   Neck:      Musculoskeletal: Normal range of motion and neck supple.      Trachea: Trachea normal. No tracheal deviation.   Cardiovascular:      Rate and Rhythm: Normal rate.   Pulmonary:      Effort: Pulmonary effort is normal. No respiratory distress.      Breath sounds: No stridor. No wheezing.   Musculoskeletal: Normal range of motion.   Skin:     General:  Skin is warm and dry.      Coloration: Skin is not pale.   Neurological:      Mental Status: She is alert and oriented to person, place, and time.      Coordination: Coordination normal.      Gait: Gait normal.   Psychiatric:         Speech: Speech normal.         Behavior: Behavior normal. Behavior is cooperative.         Thought Content: Thought content normal.         Judgment: Judgment normal.         Assessment:     Normal low-frequency hearing, sloping to moderately-severe/severe high-frequency SNHL    Heartbeat AS  Plan:     Discussed pulsatile tinnitus. Check blood pressure. Reduce salt and caffeine.   Monitor whether firm compression of jugular vein on the affected side resolves the sound.   If so, venous hum likely etiology; if not, arterial etiology more likely.   If arterial, consideration of CTA of head & neck to check for vascular pathology. Patient agrees to let me know.    Patient to consider carotid and paravertebral artery ultrasound

## 2020-12-10 NOTE — PROGRESS NOTES
Lizeth Blake was seen 12/10/2020 for an audiological evaluation. Patient complains of hearing loss and throbbing AS. Pt reports no family Hx of HL or loud noise exposure. She had a hearing test yrs ago that showed she could not hear well in noisy backgrounds.     Results reveal a bilateral normal sloping to severe sensorineural hearing loss.    Speech Reception Thresholds were  15 dBHL for the right ear and 15 dBHL for the left ear.    Word recognition scores were good for the right ear and good for the left ear.   Tympanograms were Type A for the right ear and Type A for the left ear.    Audiogram results were reviewed in detail with patient and all questions were answered. Results will be reviewed by ENT at the completion of this note. Recommend binaural amplification pending medical clearance, hearing test in one year due to tinnitus and hearing protection in loud noise.

## 2021-01-22 ENCOUNTER — PATIENT MESSAGE (OUTPATIENT)
Dept: ADMINISTRATIVE | Facility: OTHER | Age: 76
End: 2021-01-22

## 2021-08-03 ENCOUNTER — OFFICE VISIT (OUTPATIENT)
Dept: OBSTETRICS AND GYNECOLOGY | Facility: CLINIC | Age: 76
End: 2021-08-03
Attending: OBSTETRICS & GYNECOLOGY
Payer: MEDICARE

## 2021-08-03 VITALS
WEIGHT: 133.19 LBS | HEART RATE: 69 BPM | DIASTOLIC BLOOD PRESSURE: 87 MMHG | BODY MASS INDEX: 26.01 KG/M2 | SYSTOLIC BLOOD PRESSURE: 133 MMHG

## 2021-08-03 DIAGNOSIS — Z01.419 ENCOUNTER FOR GYNECOLOGICAL EXAMINATION WITHOUT ABNORMAL FINDING: Primary | ICD-10-CM

## 2021-08-03 DIAGNOSIS — N95.2 POSTMENOPAUSAL ATROPHIC VAGINITIS: ICD-10-CM

## 2021-08-03 DIAGNOSIS — Z12.31 ENCOUNTER FOR SCREENING MAMMOGRAM FOR HIGH-RISK PATIENT: ICD-10-CM

## 2021-08-03 PROCEDURE — G0101 PR CA SCREEN;PELVIC/BREAST EXAM: ICD-10-PCS | Mod: S$GLB,,, | Performed by: OBSTETRICS & GYNECOLOGY

## 2021-08-03 PROCEDURE — 3075F PR MOST RECENT SYSTOLIC BLOOD PRESS GE 130-139MM HG: ICD-10-PCS | Mod: CPTII,S$GLB,, | Performed by: OBSTETRICS & GYNECOLOGY

## 2021-08-03 PROCEDURE — 1101F PT FALLS ASSESS-DOCD LE1/YR: CPT | Mod: CPTII,S$GLB,, | Performed by: OBSTETRICS & GYNECOLOGY

## 2021-08-03 PROCEDURE — 3288F PR FALLS RISK ASSESSMENT DOCUMENTED: ICD-10-PCS | Mod: CPTII,S$GLB,, | Performed by: OBSTETRICS & GYNECOLOGY

## 2021-08-03 PROCEDURE — 3079F PR MOST RECENT DIASTOLIC BLOOD PRESSURE 80-89 MM HG: ICD-10-PCS | Mod: CPTII,S$GLB,, | Performed by: OBSTETRICS & GYNECOLOGY

## 2021-08-03 PROCEDURE — 1160F PR REVIEW ALL MEDS BY PRESCRIBER/CLIN PHARMACIST DOCUMENTED: ICD-10-PCS | Mod: CPTII,S$GLB,, | Performed by: OBSTETRICS & GYNECOLOGY

## 2021-08-03 PROCEDURE — 3008F BODY MASS INDEX DOCD: CPT | Mod: CPTII,S$GLB,, | Performed by: OBSTETRICS & GYNECOLOGY

## 2021-08-03 PROCEDURE — 3288F FALL RISK ASSESSMENT DOCD: CPT | Mod: CPTII,S$GLB,, | Performed by: OBSTETRICS & GYNECOLOGY

## 2021-08-03 PROCEDURE — 1159F MED LIST DOCD IN RCRD: CPT | Mod: CPTII,S$GLB,, | Performed by: OBSTETRICS & GYNECOLOGY

## 2021-08-03 PROCEDURE — 3044F PR MOST RECENT HEMOGLOBIN A1C LEVEL <7.0%: ICD-10-PCS | Mod: CPTII,S$GLB,, | Performed by: OBSTETRICS & GYNECOLOGY

## 2021-08-03 PROCEDURE — 3075F SYST BP GE 130 - 139MM HG: CPT | Mod: CPTII,S$GLB,, | Performed by: OBSTETRICS & GYNECOLOGY

## 2021-08-03 PROCEDURE — 1126F PR PAIN SEVERITY QUANTIFIED, NO PAIN PRESENT: ICD-10-PCS | Mod: CPTII,S$GLB,, | Performed by: OBSTETRICS & GYNECOLOGY

## 2021-08-03 PROCEDURE — 3044F HG A1C LEVEL LT 7.0%: CPT | Mod: CPTII,S$GLB,, | Performed by: OBSTETRICS & GYNECOLOGY

## 2021-08-03 PROCEDURE — 1101F PR PT FALLS ASSESS DOC 0-1 FALLS W/OUT INJ PAST YR: ICD-10-PCS | Mod: CPTII,S$GLB,, | Performed by: OBSTETRICS & GYNECOLOGY

## 2021-08-03 PROCEDURE — 3079F DIAST BP 80-89 MM HG: CPT | Mod: CPTII,S$GLB,, | Performed by: OBSTETRICS & GYNECOLOGY

## 2021-08-03 PROCEDURE — G0101 CA SCREEN;PELVIC/BREAST EXAM: HCPCS | Mod: S$GLB,,, | Performed by: OBSTETRICS & GYNECOLOGY

## 2021-08-03 PROCEDURE — 1126F AMNT PAIN NOTED NONE PRSNT: CPT | Mod: CPTII,S$GLB,, | Performed by: OBSTETRICS & GYNECOLOGY

## 2021-08-03 PROCEDURE — 1159F PR MEDICATION LIST DOCUMENTED IN MEDICAL RECORD: ICD-10-PCS | Mod: CPTII,S$GLB,, | Performed by: OBSTETRICS & GYNECOLOGY

## 2021-08-03 PROCEDURE — 1160F RVW MEDS BY RX/DR IN RCRD: CPT | Mod: CPTII,S$GLB,, | Performed by: OBSTETRICS & GYNECOLOGY

## 2021-08-03 PROCEDURE — 3008F PR BODY MASS INDEX (BMI) DOCUMENTED: ICD-10-PCS | Mod: CPTII,S$GLB,, | Performed by: OBSTETRICS & GYNECOLOGY

## 2021-08-03 RX ORDER — ESTRADIOL 0.1 MG/G
0.5 CREAM VAGINAL
Qty: 42 G | Refills: 4 | Status: SHIPPED | OUTPATIENT
Start: 2021-08-04 | End: 2021-08-03

## 2021-08-03 RX ORDER — ESTRADIOL 0.1 MG/G
1 CREAM VAGINAL
Qty: 42 G | Refills: 4 | Status: SHIPPED | OUTPATIENT
Start: 2021-08-04 | End: 2022-01-27

## 2021-08-26 ENCOUNTER — OFFICE VISIT (OUTPATIENT)
Dept: PODIATRY | Facility: CLINIC | Age: 76
End: 2021-08-26
Payer: MEDICARE

## 2021-08-26 VITALS
SYSTOLIC BLOOD PRESSURE: 119 MMHG | BODY MASS INDEX: 25.97 KG/M2 | RESPIRATION RATE: 16 BRPM | WEIGHT: 132.25 LBS | HEIGHT: 60 IN | DIASTOLIC BLOOD PRESSURE: 70 MMHG | HEART RATE: 70 BPM

## 2021-08-26 DIAGNOSIS — B35.1 ONYCHOMYCOSIS DUE TO DERMATOPHYTE: Primary | ICD-10-CM

## 2021-08-26 PROCEDURE — 1126F PR PAIN SEVERITY QUANTIFIED, NO PAIN PRESENT: ICD-10-PCS | Mod: CPTII,S$GLB,, | Performed by: PODIATRIST

## 2021-08-26 PROCEDURE — 3078F PR MOST RECENT DIASTOLIC BLOOD PRESSURE < 80 MM HG: ICD-10-PCS | Mod: CPTII,S$GLB,, | Performed by: PODIATRIST

## 2021-08-26 PROCEDURE — 1160F PR REVIEW ALL MEDS BY PRESCRIBER/CLIN PHARMACIST DOCUMENTED: ICD-10-PCS | Mod: CPTII,S$GLB,, | Performed by: PODIATRIST

## 2021-08-26 PROCEDURE — 3008F PR BODY MASS INDEX (BMI) DOCUMENTED: ICD-10-PCS | Mod: CPTII,S$GLB,, | Performed by: PODIATRIST

## 2021-08-26 PROCEDURE — 99202 PR OFFICE/OUTPT VISIT, NEW, LEVL II, 15-29 MIN: ICD-10-PCS | Mod: S$GLB,,, | Performed by: PODIATRIST

## 2021-08-26 PROCEDURE — 3044F HG A1C LEVEL LT 7.0%: CPT | Mod: CPTII,S$GLB,, | Performed by: PODIATRIST

## 2021-08-26 PROCEDURE — 99202 OFFICE O/P NEW SF 15 MIN: CPT | Mod: S$GLB,,, | Performed by: PODIATRIST

## 2021-08-26 PROCEDURE — 1159F MED LIST DOCD IN RCRD: CPT | Mod: CPTII,S$GLB,, | Performed by: PODIATRIST

## 2021-08-26 PROCEDURE — 1159F PR MEDICATION LIST DOCUMENTED IN MEDICAL RECORD: ICD-10-PCS | Mod: CPTII,S$GLB,, | Performed by: PODIATRIST

## 2021-08-26 PROCEDURE — 1160F RVW MEDS BY RX/DR IN RCRD: CPT | Mod: CPTII,S$GLB,, | Performed by: PODIATRIST

## 2021-08-26 PROCEDURE — 3008F BODY MASS INDEX DOCD: CPT | Mod: CPTII,S$GLB,, | Performed by: PODIATRIST

## 2021-08-26 PROCEDURE — 99999 PR PBB SHADOW E&M-EST. PATIENT-LVL III: ICD-10-PCS | Mod: PBBFAC,,, | Performed by: PODIATRIST

## 2021-08-26 PROCEDURE — 3074F SYST BP LT 130 MM HG: CPT | Mod: CPTII,S$GLB,, | Performed by: PODIATRIST

## 2021-08-26 PROCEDURE — 1126F AMNT PAIN NOTED NONE PRSNT: CPT | Mod: CPTII,S$GLB,, | Performed by: PODIATRIST

## 2021-08-26 PROCEDURE — 99999 PR PBB SHADOW E&M-EST. PATIENT-LVL III: CPT | Mod: PBBFAC,,, | Performed by: PODIATRIST

## 2021-08-26 PROCEDURE — 3078F DIAST BP <80 MM HG: CPT | Mod: CPTII,S$GLB,, | Performed by: PODIATRIST

## 2021-08-26 PROCEDURE — 3044F PR MOST RECENT HEMOGLOBIN A1C LEVEL <7.0%: ICD-10-PCS | Mod: CPTII,S$GLB,, | Performed by: PODIATRIST

## 2021-08-26 PROCEDURE — 3074F PR MOST RECENT SYSTOLIC BLOOD PRESSURE < 130 MM HG: ICD-10-PCS | Mod: CPTII,S$GLB,, | Performed by: PODIATRIST

## 2022-06-15 ENCOUNTER — TELEPHONE (OUTPATIENT)
Dept: OBSTETRICS AND GYNECOLOGY | Facility: CLINIC | Age: 77
End: 2022-06-15
Payer: MEDICARE

## 2022-06-15 NOTE — TELEPHONE ENCOUNTER
----- Message from Jen Bob sent at 6/15/2022  9:12 AM CDT -----  Patient called asking for a call back to schedule an annual exam.  She can be reached at the number in her chart .

## 2022-06-22 ENCOUNTER — PATIENT MESSAGE (OUTPATIENT)
Dept: OBSTETRICS AND GYNECOLOGY | Facility: CLINIC | Age: 77
End: 2022-06-22
Payer: MEDICARE

## 2022-06-22 DIAGNOSIS — R39.9 UTI SYMPTOMS: Primary | ICD-10-CM

## 2022-06-22 RX ORDER — NITROFURANTOIN 25; 75 MG/1; MG/1
100 CAPSULE ORAL 2 TIMES DAILY
Qty: 14 CAPSULE | Refills: 0 | Status: SHIPPED | OUTPATIENT
Start: 2022-06-22 | End: 2022-06-29

## 2022-08-10 PROBLEM — R47.89 WORD FINDING PROBLEM: Status: ACTIVE | Noted: 2022-08-10

## 2022-08-10 PROBLEM — Z86.16 HISTORY OF 2019 NOVEL CORONAVIRUS DISEASE (COVID-19): Status: ACTIVE | Noted: 2022-08-10

## 2022-10-02 PROBLEM — E03.9 ACQUIRED HYPOTHYROIDISM: Status: ACTIVE | Noted: 2022-10-02

## 2022-12-28 PROBLEM — M72.0 DUPUYTREN'S DISEASE OF PALM: Status: ACTIVE | Noted: 2022-12-28

## 2022-12-28 PROBLEM — M85.80 OSTEOPENIA: Status: ACTIVE | Noted: 2022-12-28

## 2023-01-03 PROBLEM — R92.8 ABNORMAL MAMMOGRAM OF RIGHT BREAST: Status: ACTIVE | Noted: 2023-01-03

## 2023-03-28 ENCOUNTER — OFFICE VISIT (OUTPATIENT)
Dept: OBSTETRICS AND GYNECOLOGY | Facility: CLINIC | Age: 78
End: 2023-03-28
Attending: OBSTETRICS & GYNECOLOGY
Payer: MEDICARE

## 2023-03-28 VITALS
WEIGHT: 118 LBS | BODY MASS INDEX: 23.16 KG/M2 | DIASTOLIC BLOOD PRESSURE: 72 MMHG | SYSTOLIC BLOOD PRESSURE: 126 MMHG | HEIGHT: 60 IN

## 2023-03-28 DIAGNOSIS — Z01.419 ENCOUNTER FOR GYNECOLOGICAL EXAMINATION WITHOUT ABNORMAL FINDING: Primary | ICD-10-CM

## 2023-03-28 DIAGNOSIS — N95.2 POSTMENOPAUSAL ATROPHIC VAGINITIS: ICD-10-CM

## 2023-03-28 DIAGNOSIS — Z12.31 SCREENING MAMMOGRAM, ENCOUNTER FOR: ICD-10-CM

## 2023-03-28 PROCEDURE — G0101 CA SCREEN;PELVIC/BREAST EXAM: HCPCS | Mod: S$GLB,,, | Performed by: OBSTETRICS & GYNECOLOGY

## 2023-03-28 PROCEDURE — 3078F DIAST BP <80 MM HG: CPT | Mod: CPTII,S$GLB,, | Performed by: OBSTETRICS & GYNECOLOGY

## 2023-03-28 PROCEDURE — 1159F PR MEDICATION LIST DOCUMENTED IN MEDICAL RECORD: ICD-10-PCS | Mod: CPTII,S$GLB,, | Performed by: OBSTETRICS & GYNECOLOGY

## 2023-03-28 PROCEDURE — G0101 PR CA SCREEN;PELVIC/BREAST EXAM: ICD-10-PCS | Mod: S$GLB,,, | Performed by: OBSTETRICS & GYNECOLOGY

## 2023-03-28 PROCEDURE — 1126F PR PAIN SEVERITY QUANTIFIED, NO PAIN PRESENT: ICD-10-PCS | Mod: CPTII,S$GLB,, | Performed by: OBSTETRICS & GYNECOLOGY

## 2023-03-28 PROCEDURE — 1126F AMNT PAIN NOTED NONE PRSNT: CPT | Mod: CPTII,S$GLB,, | Performed by: OBSTETRICS & GYNECOLOGY

## 2023-03-28 PROCEDURE — 3074F SYST BP LT 130 MM HG: CPT | Mod: CPTII,S$GLB,, | Performed by: OBSTETRICS & GYNECOLOGY

## 2023-03-28 PROCEDURE — 1159F MED LIST DOCD IN RCRD: CPT | Mod: CPTII,S$GLB,, | Performed by: OBSTETRICS & GYNECOLOGY

## 2023-03-28 PROCEDURE — 3074F PR MOST RECENT SYSTOLIC BLOOD PRESSURE < 130 MM HG: ICD-10-PCS | Mod: CPTII,S$GLB,, | Performed by: OBSTETRICS & GYNECOLOGY

## 2023-03-28 PROCEDURE — 99999 PR PBB SHADOW E&M-EST. PATIENT-LVL III: ICD-10-PCS | Mod: PBBFAC,,, | Performed by: OBSTETRICS & GYNECOLOGY

## 2023-03-28 PROCEDURE — 99999 PR PBB SHADOW E&M-EST. PATIENT-LVL III: CPT | Mod: PBBFAC,,, | Performed by: OBSTETRICS & GYNECOLOGY

## 2023-03-28 PROCEDURE — 3078F PR MOST RECENT DIASTOLIC BLOOD PRESSURE < 80 MM HG: ICD-10-PCS | Mod: CPTII,S$GLB,, | Performed by: OBSTETRICS & GYNECOLOGY

## 2023-03-28 RX ORDER — ESTRADIOL 0.1 MG/G
CREAM VAGINAL
Qty: 42.5 G | Refills: 3 | Status: SHIPPED | OUTPATIENT
Start: 2023-03-28

## 2023-03-28 NOTE — PROGRESS NOTES
Subjective:       Patient ID: Lizeth Blake is a 77 y.o. female.    Chief Complaint:  Gynecologic Exam      History of Present Illness  Gynecologic Exam  Pertinent negatives include no abdominal pain, back pain or headaches.   Lizeth Blake is a 77 y.o. female  here for her annual GYN exam.   Uses vaginal estradiol cream regularly, wants to continue.   denies vaginal itching or irritation.  Denies vaginal discharge.   She is sexually active. She has had1 partner for 47 years .   History of abnormal pap: No  Last Pap:  had hysterectomy  Last MMG: normal--routine follow-up in 12 months  Last Dexa: 2022: Osteopenia (but improved from previous)  Last Colonoscopy:  colonoscopy several years ago without abnormalities.  denies domestic violence. She does feel safe at home.     Past Medical History:   Diagnosis Date    a Elevated Cardiac CT Calcium Score ###    Dr. Jose Juan Sanchez; 21 RXd ASA 81 Mg Daily; 21 Cardiac CT Ca+ Score = 225.8 (See Report)    a Recurrent Vasovagal Syncopal Episodes ###    This Has Been A Problem For Her For Her Whole Life; 19 Ordered A Cortisol Level; [facility] ED 19 Visit For This    b Chronic Hypotension #######    21 (Phone Log) RXd Midodrine 2.5 Mg TID; 19 Cortisol And ACTH = Normal    c Hypercholesterolemia with High HDL     Goal LDL Is < 100 (Cardiac CT Ca+ Score); 21 RXd Crestor 5 Mg qHS; 2/10/21 RXd Crestor 10 Mg qHS (But She Never Took It?)    d Family H/O DM     e Hypothyroidism     10/2/22 RXd Levothyroxine 25 Mcg qAM    f Osteopenia (Improved From Previous Osteoporosis)     20 RXd Fosamax 70 Mg Weekly (But She Stopped Taking It For Fear Of Side Effects); 20 Continued Her OTC D3 5K IU Daily, And OTC CaCO3 1,200 Mg Daily    i H/O COVID-19 Infection     Her 22 COVID-19 Swab Test = Was Positive    j Chronic Constipation     Dr. Yovany Sauer    j H/O Acute Sigmoid Diverticulitis In 2017     Dr. Yovany Sauer; 3/10/17 ABD/Pelvic  "CT W/WO Contrast = (See Report)    j H/O Hyperplastic Colon Polyps On Previous TC ###    Dr. Yovany Sauer; Dr. Marysol King (After Her 6/8/15 TC Was Polyp Free): "Repeat TC In 10 YRs"    j Sigmoid And Descending Diverticulosis     Dr. Yovany Sauer; 6/8/15 TC (Dr. Marysol King) = Small IH And EHs, Multiple Small-Mouthed Sigmoid And Descending Colon Diverticulosis; With NO Polyps, And Otherwise Normal    j Small Internal And External Hemorrhoids     Dr. Yovany Sauer; 6/8/15 TC (Dr. Marysol King) = Small IH And EHs, Multiple Small-Mouthed Sigmoid And Descending Colon Diverticulosis; With NO Polyps, And Otherwise Normal    k Abnormal Right Mammogram 01/03/2023    1/3/22 DX Right Mammo And U/S = Ordered    k Family H/O Breast Cancer     k H/O Breast Lumps With Negative Bx     l BLE Toe And Foot And Calf Cramps     7/8/16 Zanaflex 1-2 Mg qHS Prn    l Cervical Spinal DDD     Dr. Christopher Kenny; 5/1/19 C-Spine MRI W/O Contrast = (See Report)    l Lumbar Spinal DDD     Dr. Christopher Kenny; 5/1/19 L-Spine MRI W/O Contrast = (See Report)    l Lumbar Spinal Stenosis     Dr. Christopher Kenny; 5/1/19 L-Spine MRI W/O Contrast = (See Report)    l Right Palm Dupuytren's Nodule     Dr. Attila silva Rare Migraines     m Word Finding Difficulty     This Is Mild And Of Slow Onset; 8/10/22 TFTs And Vitamin B12 = Ordered    q Borderline Vitamin D Deficiency     2/10/19 RXd OTC D3 5K IU Daily; 1/7/18 RXd OTC D3 2K IU Daily    Wellness Visit 2/08/2022      Past Surgical History:   Procedure Laterality Date    arm surgery( L shoulder) Left 1980    open procedure on shoulder with tendon reconstruction & pin placement    BREAST BIOPSY Right     2 bx- both benign    Breast biopsy x 2 Right Breast      DILATION AND CURETTAGE OF UTERUS      HYSTERECTOMY  2000    TAHBSO    MOUTH SURGERY      OOPHORECTOMY      PELVIC LAPAROSCOPY  age 36    SHOULDER ARTHROSCOPY Left 02/2014    Tonsillectomy       Social History     Socioeconomic History    " Marital status:    Tobacco Use    Smoking status: Never    Smokeless tobacco: Never   Substance and Sexual Activity    Alcohol use: Not Currently     Alcohol/week: 0.0 standard drinks     Comment: 3 drinks per year    Drug use: No    Sexual activity: Yes     Partners: Male     Birth control/protection: See Surgical Hx     Comment:  since  (47 years)     Family History   Problem Relation Age of Onset    Breast cancer Mother 73    Diabetes Mother     Coronary artery disease Father     Stroke Sister     Diabetes Maternal Aunt     Diabetes Maternal Uncle     Ovarian cancer Paternal Aunt     Cervical cancer Paternal Aunt     Diabetes Maternal Grandmother     Colon cancer Cousin     Hypertension Neg Hx      OB History          2    Para   1    Term   1       0    AB   1    Living   1         SAB   0    IAB   0    Ectopic   0    Multiple   0    Live Births   1                 /72   Ht 5' (1.524 m)   Wt 53.5 kg (118 lb)   BMI 23.05 kg/m²         GYN & OB History    Date of Last Pap: No result found    OB History    Para Term  AB Living   2 1 1 0 1 1   SAB IAB Ectopic Multiple Live Births   0 0 0 0 1      # Outcome Date GA Lbr Dash/2nd Weight Sex Delivery Anes PTL Lv   2 AB            1 Term      Vag-Spont   JAS       Review of Systems  Review of Systems   Constitutional:  Negative for activity change, appetite change, fatigue and unexpected weight change.   HENT: Negative.     Eyes:  Negative for visual disturbance.   Respiratory:  Negative for shortness of breath and wheezing.    Cardiovascular:  Negative for chest pain, palpitations and leg swelling.   Gastrointestinal:  Negative for abdominal pain, bloating and blood in stool.   Endocrine: Negative for diabetes and hair loss.   Genitourinary:  Positive for vaginal dryness. Negative for decreased libido, dyspareunia and hot flashes.   Musculoskeletal:  Negative for back pain and joint swelling.   Integumentary:   Negative for acne, hair changes and nipple discharge.   Neurological:  Negative for headaches.   Hematological:  Does not bruise/bleed easily.   Psychiatric/Behavioral:  Negative for depression and sleep disturbance. The patient is not nervous/anxious.    Breast: Negative for mastodynia and nipple discharge        Objective:      Physical Exam:   Constitutional: She is oriented to person, place, and time. She appears well-developed and well-nourished.    HENT:   Head: Normocephalic and atraumatic.    Eyes: Pupils are equal, round, and reactive to light. EOM are normal.     Cardiovascular:  Normal rate and regular rhythm.             Pulmonary/Chest: Effort normal and breath sounds normal.   BREASTS:  no mass, no tenderness, no deformity and no retraction. Right breast exhibits no inverted nipple, no mass, no nipple discharge, no skin change, no tenderness, no bleeding and no swelling. Left breast exhibits no inverted nipple, no mass, no nipple discharge, no skin change, no tenderness, no bleeding and no swelling. Breasts are symmetrical.              Abdominal: Soft. Bowel sounds are normal.     Genitourinary:    Pelvic exam was performed with patient supine.      Genitourinary Comments: PELVIC: Normal external female genitalia without lesions. Normal hair distribution. Adequate perineal body, normal urethral meatus. Vagina atrophic without lesions or discharge. No significant cystocele or rectocele. Bimanual exam shows uterus and cervix to be surgically absent. Adnexa without masses or tenderness.  RECTAL: Deferred                 Musculoskeletal: Normal range of motion and moves all extremeties.       Neurological: She is alert and oriented to person, place, and time.    Skin: Skin is warm and dry.    Psychiatric: She has a normal mood and affect.            Assessment:        1. Encounter for gynecological examination without abnormal finding    2. Postmenopausal atrophic vaginitis    3. Screening mammogram,  encounter for                Plan:      Problem List Items Addressed This Visit    None  Visit Diagnoses       Encounter for gynecological examination without abnormal finding    -  Primary    Postmenopausal atrophic vaginitis        Relevant Medications    estradioL (ESTRACE) 0.01 % (0.1 mg/gram) vaginal cream    Screening mammogram, encounter for                 Follow up in about 2 years (around 3/28/2025).

## 2023-05-18 PROBLEM — Z86.010 HISTORY OF COLON POLYPS: Status: ACTIVE | Noted: 2023-05-18

## 2023-05-18 PROBLEM — R92.8 ABNORMAL MAMMOGRAM OF RIGHT BREAST: Status: RESOLVED | Noted: 2023-01-03 | Resolved: 2023-05-18

## 2023-05-18 PROBLEM — Z87.19 HISTORY OF DIVERTICULITIS: Status: ACTIVE | Noted: 2023-05-18

## 2023-05-18 PROBLEM — J30.9 ALLERGIC SINUSITIS: Status: ACTIVE | Noted: 2023-05-18

## 2023-05-18 PROBLEM — Z86.0100 HISTORY OF COLON POLYPS: Status: ACTIVE | Noted: 2023-05-18

## 2023-10-16 ENCOUNTER — TELEPHONE (OUTPATIENT)
Dept: OBSTETRICS AND GYNECOLOGY | Facility: CLINIC | Age: 78
End: 2023-10-16
Payer: MEDICARE

## 2023-10-16 NOTE — TELEPHONE ENCOUNTER
----- Message from Adri Doran sent at 10/16/2023  3:51 PM CDT -----  .Type: Patient Call Back    Who called: Self     What is the request in detail: Requesting an order for a mammogram. Stated right breast is hurting     Can the clinic reply by [facility]? No     Would the patient rather a call back or a response via [facility]? Call Back     Best call back number: .[phone] (home)       Additional Information:

## 2023-10-16 NOTE — TELEPHONE ENCOUNTER
Pt called having right breast tenderness. Pt states its the same breast she had the additional views on. Pt states she would like another mammogram or something. Pt states her mother  of breast cancer so would like to know what Dr Glass recommends. Advised pt will talk to Dr Glass and see if she would like to see pt or just order mammogram and u/s. Pt verbalized understanding

## 2023-10-16 NOTE — TELEPHONE ENCOUNTER
----- Message from Ariadne Bob sent at 10/16/2023  8:21 AM CDT -----      Name of Who is Calling: MAIA BELL [[id number]]      What is the request in detail: Pt called to speak with the office.Please contact to further discuss and advise.          Can the clinic reply by [facility]: Y      What Number to [facility] Back [facility]: [phone]

## 2023-10-17 DIAGNOSIS — Z80.3 FAMILY HISTORY OF BREAST CANCER IN FIRST DEGREE RELATIVE: ICD-10-CM

## 2023-10-17 DIAGNOSIS — N64.4 MASTODYNIA OF RIGHT BREAST: Primary | ICD-10-CM

## 2023-10-17 NOTE — TELEPHONE ENCOUNTER
Called patient to inform that Dr Glass put in orders for a right breast ultrasound and diagnostic mammogram. Patient was given the phone number to Imaging in [place] and states she will call to schedule because she has a friend who also needs to schedule so they can ride together.

## 2024-02-28 ENCOUNTER — TELEPHONE (OUTPATIENT)
Dept: NEUROLOGY | Facility: CLINIC | Age: 79
End: 2024-02-28
Payer: MEDICARE

## 2024-02-28 NOTE — TELEPHONE ENCOUNTER
Spoke with the pt, given to providers outside of [facility] in hopes of a sooner appt.  She declined to schedule.

## 2024-05-22 PROBLEM — Z86.0100 HISTORY OF COLON POLYPS: Status: RESOLVED | Noted: 2023-05-18 | Resolved: 2024-05-22

## 2024-05-22 PROBLEM — Z86.010 HISTORY OF COLON POLYPS: Status: RESOLVED | Noted: 2023-05-18 | Resolved: 2024-05-22

## 2024-07-24 ENCOUNTER — OFFICE VISIT (OUTPATIENT)
Dept: CARDIOLOGY | Facility: CLINIC | Age: 79
End: 2024-07-24
Payer: MEDICARE

## 2024-07-24 VITALS
HEART RATE: 64 BPM | SYSTOLIC BLOOD PRESSURE: 128 MMHG | WEIGHT: 132.06 LBS | BODY MASS INDEX: 25.93 KG/M2 | HEIGHT: 60 IN | DIASTOLIC BLOOD PRESSURE: 74 MMHG

## 2024-07-24 DIAGNOSIS — R93.1 ABNORMAL SCREENING CARDIAC CT: ICD-10-CM

## 2024-07-24 DIAGNOSIS — E78.00 HYPERCHOLESTEROLEMIA: Primary | ICD-10-CM

## 2024-07-24 DIAGNOSIS — R55 VASOVAGAL SYNCOPE: ICD-10-CM

## 2024-07-24 DIAGNOSIS — E03.9 ACQUIRED HYPOTHYROIDISM: ICD-10-CM

## 2024-07-24 PROCEDURE — 1101F PT FALLS ASSESS-DOCD LE1/YR: CPT | Mod: CPTII,S$GLB,, | Performed by: INTERNAL MEDICINE

## 2024-07-24 PROCEDURE — 3074F SYST BP LT 130 MM HG: CPT | Mod: CPTII,S$GLB,, | Performed by: INTERNAL MEDICINE

## 2024-07-24 PROCEDURE — 99204 OFFICE O/P NEW MOD 45 MIN: CPT | Mod: S$GLB,,, | Performed by: INTERNAL MEDICINE

## 2024-07-24 PROCEDURE — 1126F AMNT PAIN NOTED NONE PRSNT: CPT | Mod: CPTII,S$GLB,, | Performed by: INTERNAL MEDICINE

## 2024-07-24 PROCEDURE — 3078F DIAST BP <80 MM HG: CPT | Mod: CPTII,S$GLB,, | Performed by: INTERNAL MEDICINE

## 2024-07-24 PROCEDURE — 1160F RVW MEDS BY RX/DR IN RCRD: CPT | Mod: CPTII,S$GLB,, | Performed by: INTERNAL MEDICINE

## 2024-07-24 PROCEDURE — 1159F MED LIST DOCD IN RCRD: CPT | Mod: CPTII,S$GLB,, | Performed by: INTERNAL MEDICINE

## 2024-07-24 PROCEDURE — 99999 PR PBB SHADOW E&M-EST. PATIENT-LVL III: CPT | Mod: PBBFAC,,, | Performed by: INTERNAL MEDICINE

## 2024-07-24 PROCEDURE — 3288F FALL RISK ASSESSMENT DOCD: CPT | Mod: CPTII,S$GLB,, | Performed by: INTERNAL MEDICINE

## 2024-07-24 PROCEDURE — 93005 ELECTROCARDIOGRAM TRACING: CPT | Mod: PO

## 2024-07-24 NOTE — PROGRESS NOTES
Subjective:    Patient ID:  Lizeth Blake is a 78 y.o. female patient here for evaluation Follow-up      History of Present Illness:  Patient evaluation patient referred for abnormal calcium score, greater than 400.  History of normal past lipid panels but remains on statin agent.  Positive family history.  Nonsmoker, no diabetes mellitus.  Overall asymptomatic.  Denies angina, dyspnea, no PND orthopnea.  No edema.  No prior history of documented ischemic structural arrhythmic heart disease.    No history of CVA, Ca.  No DVT PE.  No known chronic lung disease.  General health good.    Able to perform activities of daily living without issues.             Review of patient's allergies indicates:   Allergen Reactions    Reglan [metoclopramide] Nausea Only and Other (See Comments)     dizzyness    Zinc Hives    Tetanus immune globulin Rash    Tetanus vaccines and toxoid Rash       Past Medical History:   Diagnosis Date    a Elevated Cardiac CT Calcium Score 06/08/2024 6/8/24 Referred To Dr. Moshe Ford; Dr. Jose Juan Sanchez Did Nothing; 1/28/21 RXd ASA 81 Mg Daily; 6/6/24 Cardiac CT Ca+ Score = 428.0 (See Report); 2/1/21 Cardiac CT Ca+ Score = 225.8 (See Report)    a Recurrent Vasovagal Syncopal Episodes ###    This Has Been A Problem For Her For Her Whole Life; 7/18/19 Ordered A Cortisol Level; [facility] ED 7/8/19 Visit For This    b Chronic Hypotension ###    5/13/21 (Phone Log) RXd Midodrine 2.5 Mg TID (But She Didn't Take IT); 8/1/19 Cortisol And ACTH = Normal    c Hypercholesterolemia with High HDL     Goal LDL Is < 100 (Cardiac CT Ca+ Score); 8/2/21 RXd Crestor 5 Mg qHS; 2/10/21 RXd Crestor 10 Mg qHS (But She Never Took It?)    d Family H/O DM     e Hypothyroidism     10/2/22 RXd Levothyroxine 25 Mcg qAM    f Osteopenia (Improved From Previous Osteoporosis)     1/31/20 RXd Fosamax 70 Mg Weekly (But She Stopped Taking It For Fear Of Side Effects); 1/31/20 Continued Her OTC D3 5K IU Daily, And OTC CaCO3 1,200 Mg  "Daily    i H/O COVID-19 Infection     Her 1/12/22 COVID-19 Swab Test = Was Positive    j  H/O Adenomatous Colon Polyps On 5/25/23 Colonoscopy     Dr. Yovany Sauer: "Repeat TC In 5 Years"    j Chronic Constipation     Dr. Yovany Sauer    j H/O Acute Sigmoid Diverticulitis In 2017     Dr. Yovany Sauer; 3/10/17 ABD/Pelvic CT W/WO Contrast = (See Report)    j Sigmoid And Descending Diverticulosis     Dr. Yovany Sauer; 6/8/15 TC (Dr. Marysol King) = Small IH And EHs, Multiple Small-Mouthed Sigmoid And Descending Colon Diverticulosis; With NO Polyps, And Otherwise Normal    j Small Internal And External Hemorrhoids     Dr. Yovany Sauer; 6/8/15 TC (Dr. Marysol King) = Small IH And EHs, Multiple Small-Mouthed Sigmoid And Descending Colon Diverticulosis; With NO Polyps, And Otherwise Normal    k Family H/O Breast Cancer     k H/O Breast Lumps With Negative Bx     l BLE Toe And Foot And Calf Cramps     7/8/16 Zanaflex 1-2 Mg qHS Prn    l Cervical Spinal DDD     Dr. Christopher Kenny; 5/1/19 C-Spine MRI W/O Contrast = (See Report)    l Lumbar Spinal DDD     Dr. Christopher Kenny; 5/1/19 L-Spine MRI W/O Contrast = (See Report)    l Lumbar Spinal Stenosis     Dr. Christopher Kenny; 5/1/19 L-Spine MRI W/O Contrast = (See Report)    l Right Palm Dupuytren's Nodule     Dr. Attila Ortega    m Borderline Vitamin B12 Deficiency     / RXd OTC Vitamin B12 2K Mcg PO Daily/    m Rare Migraines     m Word Finding Difficulty     This Is Mild And Of Slow Onset; 8/10/22 TFTs And Vitamin B12 = Ordered    o Allergic Rhinosinusitis     q Borderline Vitamin D Deficiency     2/10/19 RXd OTC D3 5K IU Daily; 1/7/18 RXd OTC D3 2K IU Daily    Wellness Visit 5/22/2024      Past Surgical History:   Procedure Laterality Date    arm surgery( L shoulder) Left 1980    open procedure on shoulder with tendon reconstruction & pin placement    BREAST BIOPSY Right     2 bx- both benign    Breast biopsy x 2 Right Breast      COLONOSCOPY N/A 5/25/2023    Procedure: " COLONOSCOPY;  Surgeon: Yovany Sauer Jr., MD;  Location: [facility];  Service: Endoscopy;  Laterality: N/A;    DILATION AND CURETTAGE OF UTERUS      HYSTERECTOMY  2000    TAHBSO    MOUTH SURGERY      OOPHORECTOMY      PELVIC LAPAROSCOPY  age 36    SHOULDER ARTHROSCOPY Left 02/2014    Tonsillectomy       Social History     Tobacco Use    Smoking status: Never     Passive exposure: Never    Smokeless tobacco: Never   Substance Use Topics    Alcohol use: Not Currently     Alcohol/week: 0.0 standard drinks of alcohol     Comment: 3 drinks per year    Drug use: No        Review of Systems:    As noted in HPI in addition         REVIEW OF SYSTEMS  Review of Systems   Constitutional: Negative for decreased appetite, diaphoresis, night sweats, weight gain and weight loss.   HENT:  Negative for nosebleeds and odynophagia.    Eyes:  Negative for double vision and photophobia.   Cardiovascular:  Negative for chest pain, claudication, cyanosis, dyspnea on exertion, irregular heartbeat, leg swelling, near-syncope, orthopnea, palpitations, paroxysmal nocturnal dyspnea and syncope.   Respiratory:  Negative for cough, hemoptysis, shortness of breath and wheezing.    Hematologic/Lymphatic: Negative for adenopathy.   Skin:  Negative for flushing, skin cancer and suspicious lesions.   Musculoskeletal:  Negative for gout, myalgias and neck pain.   Gastrointestinal:  Negative for abdominal pain, heartburn, hematemesis and hematochezia.   Genitourinary:  Negative for bladder incontinence, hesitancy and nocturia.   Neurological:  Negative for focal weakness, headaches, light-headedness and paresthesias.   Psychiatric/Behavioral:  Negative for memory loss and substance abuse.        Objective:        Vitals:    07/24/24 0920   BP: 128/74   Pulse: 64       Lab Results   Component Value Date    WBC 4.57 04/25/2024    HGB 14.6 04/25/2024    HCT 44.5 04/25/2024     04/25/2024    CHOL 145 04/25/2024    TRIG 62 04/25/2024    HDL 66  04/25/2024    ALT 24 04/25/2024    AST 39 (H) 04/25/2024     04/25/2024    K 4.3 04/25/2024     04/25/2024    CREATININE 0.87 04/25/2024    BUN 22 (H) 04/25/2024    CO2 28 04/25/2024    TSH 1.510 04/25/2024    HGBA1C 5.3 04/25/2024      CARDIOGRAM RESULTS  No results found for this or any previous visit.    No results found for this or any previous visit.          CURRENT/PREVIOUS VISIT EKG  Results for orders placed or performed during the hospital encounter of 05/11/21   EKG 12-lead    Collection Time: 05/11/21  1:09 PM    Narrative    Test Reason : R40.20,    Vent. Rate : 051 BPM     Atrial Rate : 051 BPM     P-R Int : 192 ms          QRS Dur : 072 ms      QT Int : 480 ms       P-R-T Axes : 073 050 066 degrees     QTc Int : 442 ms    Sinus bradycardia  Otherwise normal ECG  No previous ECGs available  Confirmed by Daniel HOLDER, Jose Juan DOUGLAS ([id number]) on 5/12/2021 2:52:50 PM    Referred By: LELIA   SELF           Confirmed By:Jose Juan Sanchez MD     No valid procedures specified.   No results found for this or any previous visit.    No valid procedures specified.        PHYSICAL EXAM  GENERAL: well built, well nourished, well-developed in no apparent distress alert and oriented.   HEENT: Normocephalic. Pupils normal and conjunctivae normal.  Mucous membranes normal, no cyanosis or icterus, trachea central,no pallor or icterus is noted..   NECK: No JVD. No bruit..   THYROID: Thyroid not enlarged. No nodules present..   CARDIAC:  Normal S1-S2.  No murmur rub click or gallop.  PMI nondisplaced.  CHEST ANATOMY: normal.   LUNGS: Clear to auscultation. No wheezing or rhonchi..   ABDOMEN: Soft no masses or organomegaly.  No abdomen pulsations or bruits.  Normal bowel sounds. No pulsations and no masses felt, No guarding or rebound.   URINARY: No dixon catheter   EXTREMITIES: No cyanosis, clubbing or edema noted at this time., no calf tenderness bilaterally.   PERIPHERAL VASCULAR SYSTEM: Good palpable distal pulses.   2+ femoral, popliteal and pedal pulses.  No bruits    CENTRAL NERVOUS SYSTEM: No focal motor or sensory deficits noted.   SKIN: Skin without lesions, moist, well perfused.   MUSCLE STRENGTH & TONE: No noteable weakness, atrophy or abnormal movement.     I HAVE REVIEWED :    The vital signs, nurses notes, and all the pertinent radiology and labs.         Current Outpatient Medications   Medication Instructions    albuterol (PROVENTIL/VENTOLIN HFA) 90 mcg/actuation inhaler 2 puffs, 4 times daily    cholecalciferol (vitamin D3) (VITAMIN D3) 2,000 Units, Oral, Daily    cyanocobalamin (vitamin B-12) 2,000 mcg, Oral, Daily    estradioL (ESTRACE) 0.01 % (0.1 mg/gram) vaginal cream PLACE 1 GRAM VAGINALLY 3 TIMES A WEEK    fluticasone propionate (FLONASE) 100 mcg, Each Nostril, Daily PRN    levothyroxine (SYNTHROID) 25 mcg, Oral, Before breakfast    rosuvastatin (CRESTOR) 5 mg, Oral, Nightly          Assessment:   Abnormal calcium score, greater than 400.  Normal lipid panel currently on Crestor 5 daily.        Plan:   Stress echo, carotid ultrasound.  Call results of normal.          No follow-ups on file.

## 2024-07-27 LAB
OHS QRS DURATION: 64 MS
OHS QTC CALCULATION: 424 MS

## 2024-08-10 DIAGNOSIS — N95.2 POSTMENOPAUSAL ATROPHIC VAGINITIS: ICD-10-CM

## 2024-08-12 RX ORDER — ESTRADIOL 0.1 MG/G
CREAM VAGINAL
Qty: 42.5 G | Refills: 3 | Status: SHIPPED | OUTPATIENT
Start: 2024-08-12

## 2024-08-12 NOTE — TELEPHONE ENCOUNTER
Refill Routing Note   Medication(s) are not appropriate for processing by [facility] for the following reason(s):        Patient not seen by provider within 15 months    [facility] action(s):  Defer               Appointments  past 12m or future 3m with PCP    Date Provider   Last Visit   3/28/2023 Radha Glass MD   Next Visit   Visit date not found Radha Glass MD   ED visits in past 90 days: 0        Note composed:6:48 AM 08/12/2024

## 2024-08-16 ENCOUNTER — HOSPITAL ENCOUNTER (OUTPATIENT)
Dept: CARDIOLOGY | Facility: HOSPITAL | Age: 79
Discharge: HOME | End: 2024-08-16
Attending: INTERNAL MEDICINE
Payer: MEDICARE

## 2024-08-16 VITALS — HEART RATE: 61 BPM | HEIGHT: 60 IN | BODY MASS INDEX: 25.91 KG/M2 | WEIGHT: 132 LBS

## 2024-08-16 DIAGNOSIS — E78.00 HYPERCHOLESTEROLEMIA: ICD-10-CM

## 2024-08-16 DIAGNOSIS — R93.1 ABNORMAL SCREENING CARDIAC CT: ICD-10-CM

## 2024-08-16 DIAGNOSIS — E03.9 ACQUIRED HYPOTHYROIDISM: ICD-10-CM

## 2024-08-16 DIAGNOSIS — R55 VASOVAGAL SYNCOPE: ICD-10-CM

## 2024-08-16 LAB
ASCENDING AORTA: 2.44 CM
BSA FOR ECHO PROCEDURE: 1.59 M2
CV ECHO LV RWT: 0.25 CM
CV STRESS BASE HR: 61 BPM
DIASTOLIC BLOOD PRESSURE: 74 MMHG
DOP CALC LVOT AREA: 3.2 CM2
DOP CALC LVOT DIAMETER: 2.03 CM
DOP CALC LVOT PEAK VEL: 0.87 M/S
DOP CALC LVOT STROKE VOLUME: 64.05 CM3
DOP CALCLVOT PEAK VEL VTI: 19.8 CM
E WAVE DECELERATION TIME: 175.96 MSEC
E/A RATIO: 0.88
E/E' RATIO: 12 M/S
ECHO LV POSTERIOR WALL: 0.59 CM (ref 0.6–1.1)
FRACTIONAL SHORTENING: 40 % (ref 28–44)
INTERVENTRICULAR SEPTUM: 0.41 CM (ref 0.6–1.1)
IVRT: 171.27 MSEC
LEFT ARM DIASTOLIC BLOOD PRESSURE: 74 MMHG
LEFT ARM SYSTOLIC BLOOD PRESSURE: 128 MMHG
LEFT ATRIUM SIZE: 2.67 CM
LEFT CBA DIAS: 10 CM/S
LEFT CBA SYS: 47 CM/S
LEFT CCA DIST DIAS: 13 CM/S
LEFT CCA DIST SYS: 51 CM/S
LEFT CCA MID DIAS: 14 CM/S
LEFT CCA MID SYS: 58 CM/S
LEFT CCA PROX DIAS: 18 CM/S
LEFT CCA PROX SYS: 71 CM/S
LEFT ECA DIAS: 8 CM/S
LEFT ECA SYS: 68 CM/S
LEFT ICA DIST DIAS: 25 CM/S
LEFT ICA DIST SYS: 85 CM/S
LEFT ICA MID DIAS: 33 CM/S
LEFT ICA MID SYS: 92 CM/S
LEFT ICA PROX DIAS: 15 CM/S
LEFT ICA PROX SYS: 44 CM/S
LEFT INTERNAL DIMENSION IN SYSTOLE: 2.8 CM (ref 2.1–4)
LEFT VENTRICLE DIASTOLIC VOLUME INDEX: 64.22 ML/M2
LEFT VENTRICLE DIASTOLIC VOLUME: 100.19 ML
LEFT VENTRICLE MASS INDEX: 43 G/M2
LEFT VENTRICLE SYSTOLIC VOLUME INDEX: 18.9 ML/M2
LEFT VENTRICLE SYSTOLIC VOLUME: 29.46 ML
LEFT VENTRICULAR INTERNAL DIMENSION IN DIASTOLE: 4.66 CM (ref 3.5–6)
LEFT VENTRICULAR MASS: 67.27 G
LEFT VERTEBRAL DIAS: 10 CM/S
LEFT VERTEBRAL SYS: 55 CM/S
LV LATERAL E/E' RATIO: 13 M/S
LV SEPTAL E/E' RATIO: 11.14 M/S
LVED V (TEICH): 100.19 ML
LVES V (TEICH): 29.46 ML
LVOT MG: 1.73 MMHG
LVOT MV: 0.61 CM/S
MV PEAK A VEL: 0.89 M/S
MV PEAK E VEL: 0.78 M/S
MV STENOSIS PRESSURE HALF TIME: 51.03 MS
MV VALVE AREA P 1/2 METHOD: 4.31 CM2
OHS CV CAROTID RIGHT ICA EDV HIGHEST: 25
OHS CV CAROTID ULTRASOUND LEFT ICA/CCA RATIO: 1.8
OHS CV CAROTID ULTRASOUND RIGHT ICA/CCA RATIO: 1.79
OHS CV CPX 1 MINUTE RECOVERY HEART RATE: 91 BPM
OHS CV CPX 85 PERCENT MAX PREDICTED HEART RATE MALE: 122
OHS CV CPX ESTIMATED METS: 10
OHS CV CPX MAX PREDICTED HEART RATE: 143
OHS CV CPX PATIENT IS FEMALE: 1
OHS CV CPX PATIENT IS MALE: 0
OHS CV CPX PEAK DIASTOLIC BLOOD PRESSURE: 62 MMHG
OHS CV CPX PEAK HEAR RATE: 139 BPM
OHS CV CPX PEAK RATE PRESSURE PRODUCT: ABNORMAL
OHS CV CPX PEAK SYSTOLIC BLOOD PRESSURE: 161 MMHG
OHS CV CPX PERCENT MAX PREDICTED HEART RATE ACHIEVED: 101
OHS CV CPX RATE PRESSURE PRODUCT PRESENTING: 8418
OHS CV PV CAROTID LEFT HIGHEST CCA: 71
OHS CV PV CAROTID LEFT HIGHEST ICA: 92
OHS CV PV CAROTID RIGHT HIGHEST CCA: 74
OHS CV PV CAROTID RIGHT HIGHEST ICA: 77
OHS CV US CAROTID LEFT HIGHEST EDV: 33
PULM VEIN S/D RATIO: 1.33
PV PEAK D VEL: 0.4 M/S
PV PEAK S VEL: 0.53 M/S
RIGHT ARM DIASTOLIC BLOOD PRESSURE: 74 MMHG
RIGHT ARM SYSTOLIC BLOOD PRESSURE: 128 MMHG
RIGHT CBA DIAS: 12 CM/S
RIGHT CBA SYS: 40 CM/S
RIGHT CCA DIST DIAS: 11 CM/S
RIGHT CCA DIST SYS: 43 CM/S
RIGHT CCA MID DIAS: 11 CM/S
RIGHT CCA MID SYS: 57 CM/S
RIGHT CCA PROX DIAS: 10 CM/S
RIGHT CCA PROX SYS: 74 CM/S
RIGHT ECA DIAS: 8 CM/S
RIGHT ECA SYS: 52 CM/S
RIGHT ICA DIST DIAS: 20 CM/S
RIGHT ICA DIST SYS: 59 CM/S
RIGHT ICA MID DIAS: 25 CM/S
RIGHT ICA MID SYS: 77 CM/S
RIGHT ICA PROX DIAS: 19 CM/S
RIGHT ICA PROX SYS: 53 CM/S
RIGHT VERTEBRAL DIAS: 12 CM/S
RIGHT VERTEBRAL SYS: 58 CM/S
SINUS: 2.96 CM
STJ: 2.33 CM
STRESS ECHO POST EXERCISE DUR MIN: 5 MINUTES
STRESS ECHO POST EXERCISE DUR SEC: 52 SECONDS
SYSTOLIC BLOOD PRESSURE: 138 MMHG
TDI LATERAL: 0.06 M/S
TDI SEPTAL: 0.07 M/S
TDI: 0.07 M/S
Z-SCORE OF LEFT VENTRICULAR DIMENSION IN END DIASTOLE: 0.35
Z-SCORE OF LEFT VENTRICULAR DIMENSION IN END SYSTOLE: 0.02

## 2024-08-16 PROCEDURE — 93351 STRESS TTE COMPLETE: CPT | Mod: PO

## 2024-08-16 PROCEDURE — 93880 EXTRACRANIAL BILAT STUDY: CPT | Mod: PO

## 2024-08-16 PROCEDURE — 93880 EXTRACRANIAL BILAT STUDY: CPT | Mod: 26,,, | Performed by: INTERNAL MEDICINE

## 2024-08-16 PROCEDURE — 93351 STRESS TTE COMPLETE: CPT | Mod: 26,,, | Performed by: INTERNAL MEDICINE

## 2025-04-24 ENCOUNTER — TELEPHONE (OUTPATIENT)
Dept: NEUROLOGY | Facility: CLINIC | Age: 80
End: 2025-04-24
Payer: MEDICARE

## 2025-04-24 NOTE — TELEPHONE ENCOUNTER
----- Message from Claude sent at 4/24/2025  3:13 PM CDT -----  Type:  Sooner Appointment RequestCaller is requesting a sooner appointment.  Caller declined first available appointment listed below.  Caller will not accept being placed on the waitlist and is requesting a message be sent to doctor.Name of Caller:  ptWhen is the first available appointment?  N/ASymptoms:  R41.89 (ICD-10-CM) - Subjective memory [id number] (ICD-10-CM) - Family history of dementia Would the patient rather a call back or a response via [facility]? CallMafengwo Call Back Number:  [phone] Information:  Please call back to advise. Thanks!

## 2025-04-24 NOTE — TELEPHONE ENCOUNTER
Spoke with patient and scheduled appointment from referral for memory complaints and family history of dementia. Appointment scheduled for 10 am on 8/13/25 with Cecilia Hanna. Time/date/location provided.

## 2025-06-18 ENCOUNTER — TELEPHONE (OUTPATIENT)
Dept: PHARMACY | Facility: CLINIC | Age: 80
End: 2025-06-18
Payer: MEDICARE

## 2025-06-18 NOTE — TELEPHONE ENCOUNTER
[facility]/Population Health Chart Review & Patient Outreach Details For Medication Adherence Project    Reason for Outreach Encounter: 3rd Party payor non-compliance report ([employer], [employer], [employer], etc)  2.  Patient Outreach Method: Reviewed patient chart   3.   Medication in question:    Hyperlipidemia Medications              rosuvastatin (CRESTOR) 5 MG tablet Take 1 tablet (5 mg total) by mouth every evening.                  rosuvastatin  last filled  5/29 for 90 day supply      4.  Reviewed and or Updates Made To: Patient Chart  5. Outreach Outcomes and/or actions taken: Patient filled medication and is on track to be adherent  Additional Notes:

## 2025-07-15 ENCOUNTER — TELEPHONE (OUTPATIENT)
Dept: NEUROLOGY | Facility: CLINIC | Age: 80
End: 2025-07-15
Payer: MEDICARE

## 2025-07-15 NOTE — TELEPHONE ENCOUNTER
LVM returning Pt call to advise I will check schedules daily for any cancellations    Copied from CRM #[id number]. Topic: Appointments - Appointment Access  >> Jul 14, 2025  3:40 PM Denisse wrote:  Type:  Sooner Appointment Reschedule Request    Name of Caller:  patient at [phone]    Symptoms:  memory issues    Additional Information:  Patient is very concerned about her situation and would like to be seen sooner than appointment scheduled on 08/13. Patient advised of appointments being booked into next year. Please call and advise. Thank you

## 2025-08-13 ENCOUNTER — LAB VISIT (OUTPATIENT)
Dept: LAB | Facility: HOSPITAL | Age: 80
End: 2025-08-13
Payer: MEDICARE

## 2025-08-13 ENCOUNTER — OFFICE VISIT (OUTPATIENT)
Dept: NEUROLOGY | Facility: CLINIC | Age: 80
End: 2025-08-13
Payer: MEDICARE

## 2025-08-13 VITALS
RESPIRATION RATE: 16 BRPM | BODY MASS INDEX: 26.89 KG/M2 | WEIGHT: 137.69 LBS | SYSTOLIC BLOOD PRESSURE: 124 MMHG | DIASTOLIC BLOOD PRESSURE: 76 MMHG

## 2025-08-13 DIAGNOSIS — R41.89 SUBJECTIVE MEMORY COMPLAINTS: ICD-10-CM

## 2025-08-13 DIAGNOSIS — Z81.8 FAMILY HISTORY OF DEMENTIA: ICD-10-CM

## 2025-08-13 DIAGNOSIS — R41.3 OTHER AMNESIA: ICD-10-CM

## 2025-08-13 DIAGNOSIS — R41.3 OTHER AMNESIA: Primary | ICD-10-CM

## 2025-08-13 PROCEDURE — 99205 OFFICE O/P NEW HI 60 MIN: CPT | Mod: S$GLB,,,

## 2025-08-13 PROCEDURE — 1101F PT FALLS ASSESS-DOCD LE1/YR: CPT | Mod: CPTII,S$GLB,,

## 2025-08-13 PROCEDURE — 3288F FALL RISK ASSESSMENT DOCD: CPT | Mod: CPTII,S$GLB,,

## 2025-08-13 PROCEDURE — 36415 COLL VENOUS BLD VENIPUNCTURE: CPT | Mod: PO

## 2025-08-13 PROCEDURE — 1126F AMNT PAIN NOTED NONE PRSNT: CPT | Mod: CPTII,S$GLB,,

## 2025-08-13 PROCEDURE — 1159F MED LIST DOCD IN RCRD: CPT | Mod: CPTII,S$GLB,,

## 2025-08-13 PROCEDURE — 3078F DIAST BP <80 MM HG: CPT | Mod: CPTII,S$GLB,,

## 2025-08-13 PROCEDURE — 99999 PR PBB SHADOW E&M-EST. PATIENT-LVL IV: CPT | Mod: PBBFAC,,,

## 2025-08-13 PROCEDURE — 84425 ASSAY OF VITAMIN B-1: CPT

## 2025-08-13 PROCEDURE — 3074F SYST BP LT 130 MM HG: CPT | Mod: CPTII,S$GLB,,

## 2025-08-13 RX ORDER — SERTRALINE HYDROCHLORIDE 25 MG/1
25 TABLET, FILM COATED ORAL NIGHTLY
Qty: 30 TABLET | Refills: 1 | Status: SHIPPED | OUTPATIENT
Start: 2025-08-13 | End: 2025-09-12

## 2025-08-19 LAB — W VITAMIN B1: 53 UG/L

## 2025-08-28 ENCOUNTER — HOSPITAL ENCOUNTER (OUTPATIENT)
Dept: RADIOLOGY | Facility: HOSPITAL | Age: 80
Discharge: HOME | End: 2025-08-28
Payer: MEDICARE

## 2025-08-28 ENCOUNTER — TELEPHONE (OUTPATIENT)
Dept: NEUROLOGY | Facility: CLINIC | Age: 80
End: 2025-08-28
Payer: MEDICARE

## 2025-08-28 DIAGNOSIS — R41.3 OTHER AMNESIA: ICD-10-CM

## 2025-08-28 PROCEDURE — 70551 MRI BRAIN STEM W/O DYE: CPT | Mod: 26,,, | Performed by: RADIOLOGY

## 2025-08-28 PROCEDURE — 70551 MRI BRAIN STEM W/O DYE: CPT | Mod: TC,PO
